# Patient Record
Sex: MALE | Race: WHITE | NOT HISPANIC OR LATINO | URBAN - METROPOLITAN AREA
[De-identification: names, ages, dates, MRNs, and addresses within clinical notes are randomized per-mention and may not be internally consistent; named-entity substitution may affect disease eponyms.]

---

## 2017-04-27 ENCOUNTER — COMMUNICATION - HEALTHEAST (OUTPATIENT)
Dept: FAMILY MEDICINE | Facility: CLINIC | Age: 57
End: 2017-04-27

## 2017-05-29 ENCOUNTER — COMMUNICATION - HEALTHEAST (OUTPATIENT)
Dept: FAMILY MEDICINE | Facility: CLINIC | Age: 57
End: 2017-05-29

## 2017-05-29 DIAGNOSIS — I10 HTN (HYPERTENSION): ICD-10-CM

## 2017-06-26 ENCOUNTER — COMMUNICATION - HEALTHEAST (OUTPATIENT)
Dept: FAMILY MEDICINE | Facility: CLINIC | Age: 57
End: 2017-06-26

## 2017-06-26 DIAGNOSIS — I10 HTN (HYPERTENSION): ICD-10-CM

## 2017-07-04 ENCOUNTER — COMMUNICATION - HEALTHEAST (OUTPATIENT)
Dept: FAMILY MEDICINE | Facility: CLINIC | Age: 57
End: 2017-07-04

## 2017-07-04 DIAGNOSIS — I10 HTN (HYPERTENSION): ICD-10-CM

## 2017-07-04 DIAGNOSIS — M10.9 GOUT: ICD-10-CM

## 2017-07-10 ENCOUNTER — COMMUNICATION - HEALTHEAST (OUTPATIENT)
Dept: FAMILY MEDICINE | Facility: CLINIC | Age: 57
End: 2017-07-10

## 2017-07-10 DIAGNOSIS — I10 HTN (HYPERTENSION): ICD-10-CM

## 2017-07-10 DIAGNOSIS — I10 UNSPECIFIED ESSENTIAL HYPERTENSION: ICD-10-CM

## 2017-07-11 ENCOUNTER — AMBULATORY - HEALTHEAST (OUTPATIENT)
Dept: FAMILY MEDICINE | Facility: CLINIC | Age: 57
End: 2017-07-11

## 2017-07-11 ENCOUNTER — COMMUNICATION - HEALTHEAST (OUTPATIENT)
Dept: FAMILY MEDICINE | Facility: CLINIC | Age: 57
End: 2017-07-11

## 2017-07-20 ENCOUNTER — OFFICE VISIT - HEALTHEAST (OUTPATIENT)
Dept: FAMILY MEDICINE | Facility: CLINIC | Age: 57
End: 2017-07-20

## 2017-07-20 ENCOUNTER — AMBULATORY - HEALTHEAST (OUTPATIENT)
Dept: FAMILY MEDICINE | Facility: CLINIC | Age: 57
End: 2017-07-20

## 2017-07-20 DIAGNOSIS — R89.7 ABNORMAL PROSTATE BIOPSY: ICD-10-CM

## 2017-07-20 DIAGNOSIS — M10.9 GOUT: ICD-10-CM

## 2017-07-20 DIAGNOSIS — E11.9 CONTROLLED TYPE 2 DIABETES MELLITUS WITHOUT COMPLICATION (H): ICD-10-CM

## 2017-07-20 DIAGNOSIS — E66.9 OBESITY, UNSPECIFIED: ICD-10-CM

## 2017-07-20 DIAGNOSIS — E78.49 OTHER HYPERLIPIDEMIA: ICD-10-CM

## 2017-07-20 DIAGNOSIS — Z00.00 WELL ADULT EXAM: ICD-10-CM

## 2017-07-20 DIAGNOSIS — I10 UNSPECIFIED ESSENTIAL HYPERTENSION: ICD-10-CM

## 2017-07-20 DIAGNOSIS — E03.9 HYPOTHYROIDISM: ICD-10-CM

## 2017-07-20 DIAGNOSIS — G47.33 OSA (OBSTRUCTIVE SLEEP APNEA): ICD-10-CM

## 2017-07-20 LAB
CHOLEST SERPL-MCNC: 153 MG/DL
FASTING STATUS PATIENT QL REPORTED: NORMAL
HDLC SERPL-MCNC: 42 MG/DL
LDLC SERPL CALC-MCNC: 89 MG/DL
PSA SERPL-MCNC: 4.8 NG/ML (ref 0–3.5)
TRIGL SERPL-MCNC: 110 MG/DL

## 2017-07-20 ASSESSMENT — MIFFLIN-ST. JEOR: SCORE: 2157.47

## 2017-07-21 ENCOUNTER — COMMUNICATION - HEALTHEAST (OUTPATIENT)
Dept: FAMILY MEDICINE | Facility: CLINIC | Age: 57
End: 2017-07-21

## 2017-07-21 LAB — HBA1C MFR BLD: 9.7 % (ref 4.2–6.1)

## 2017-07-24 ENCOUNTER — AMBULATORY - HEALTHEAST (OUTPATIENT)
Dept: FAMILY MEDICINE | Facility: CLINIC | Age: 57
End: 2017-07-24

## 2017-07-25 ENCOUNTER — AMBULATORY - HEALTHEAST (OUTPATIENT)
Dept: FAMILY MEDICINE | Facility: CLINIC | Age: 57
End: 2017-07-25

## 2017-07-25 DIAGNOSIS — E11.9 TYPE 2 DIABETES MELLITUS (H): ICD-10-CM

## 2017-08-09 ENCOUNTER — OFFICE VISIT - HEALTHEAST (OUTPATIENT)
Dept: EDUCATION SERVICES | Facility: CLINIC | Age: 57
End: 2017-08-09

## 2017-08-09 DIAGNOSIS — E11.9 CONTROLLED TYPE 2 DIABETES MELLITUS WITHOUT COMPLICATION, WITHOUT LONG-TERM CURRENT USE OF INSULIN (H): ICD-10-CM

## 2017-10-16 ENCOUNTER — COMMUNICATION - HEALTHEAST (OUTPATIENT)
Dept: EDUCATION SERVICES | Facility: CLINIC | Age: 57
End: 2017-10-16

## 2017-10-16 DIAGNOSIS — E11.9 CONTROLLED TYPE 2 DIABETES MELLITUS WITHOUT COMPLICATION, WITHOUT LONG-TERM CURRENT USE OF INSULIN (H): ICD-10-CM

## 2018-01-08 ENCOUNTER — COMMUNICATION - HEALTHEAST (OUTPATIENT)
Dept: EDUCATION SERVICES | Facility: CLINIC | Age: 58
End: 2018-01-08

## 2018-01-08 DIAGNOSIS — E11.9 CONTROLLED TYPE 2 DIABETES MELLITUS WITHOUT COMPLICATION, WITHOUT LONG-TERM CURRENT USE OF INSULIN (H): ICD-10-CM

## 2018-04-16 ENCOUNTER — COMMUNICATION - HEALTHEAST (OUTPATIENT)
Dept: EDUCATION SERVICES | Facility: CLINIC | Age: 58
End: 2018-04-16

## 2018-04-16 DIAGNOSIS — E11.9 CONTROLLED TYPE 2 DIABETES MELLITUS WITHOUT COMPLICATION, WITHOUT LONG-TERM CURRENT USE OF INSULIN (H): ICD-10-CM

## 2018-07-09 ENCOUNTER — COMMUNICATION - HEALTHEAST (OUTPATIENT)
Dept: FAMILY MEDICINE | Facility: CLINIC | Age: 58
End: 2018-07-09

## 2018-07-09 DIAGNOSIS — E03.9 HYPOTHYROIDISM: ICD-10-CM

## 2018-07-30 ENCOUNTER — COMMUNICATION - HEALTHEAST (OUTPATIENT)
Dept: FAMILY MEDICINE | Facility: CLINIC | Age: 58
End: 2018-07-30

## 2018-08-07 ENCOUNTER — OFFICE VISIT - HEALTHEAST (OUTPATIENT)
Dept: FAMILY MEDICINE | Facility: CLINIC | Age: 58
End: 2018-08-07

## 2018-08-07 DIAGNOSIS — E66.9 OBESITY: ICD-10-CM

## 2018-08-07 DIAGNOSIS — E78.49 OTHER HYPERLIPIDEMIA: ICD-10-CM

## 2018-08-07 DIAGNOSIS — M10.9 GOUT: ICD-10-CM

## 2018-08-07 DIAGNOSIS — R97.20 ELEVATED PSA: ICD-10-CM

## 2018-08-07 DIAGNOSIS — E11.9 CONTROLLED TYPE 2 DIABETES MELLITUS WITHOUT COMPLICATION, WITHOUT LONG-TERM CURRENT USE OF INSULIN (H): ICD-10-CM

## 2018-08-07 DIAGNOSIS — R89.7 ABNORMAL PROSTATE BIOPSY: ICD-10-CM

## 2018-08-07 DIAGNOSIS — I10 ESSENTIAL HYPERTENSION: ICD-10-CM

## 2018-08-07 DIAGNOSIS — G47.33 OSA (OBSTRUCTIVE SLEEP APNEA): ICD-10-CM

## 2018-08-07 DIAGNOSIS — E03.9 HYPOTHYROIDISM: ICD-10-CM

## 2018-08-07 LAB
ALBUMIN SERPL-MCNC: 4.3 G/DL (ref 3.5–5)
ALP SERPL-CCNC: 46 U/L (ref 45–120)
ALT SERPL W P-5'-P-CCNC: 42 U/L (ref 0–45)
ANION GAP SERPL CALCULATED.3IONS-SCNC: 12 MMOL/L (ref 5–18)
AST SERPL W P-5'-P-CCNC: 24 U/L (ref 0–40)
BILIRUB SERPL-MCNC: 0.8 MG/DL (ref 0–1)
BUN SERPL-MCNC: 16 MG/DL (ref 8–22)
CALCIUM SERPL-MCNC: 9.5 MG/DL (ref 8.5–10.5)
CHLORIDE BLD-SCNC: 102 MMOL/L (ref 98–107)
CHOLEST SERPL-MCNC: 147 MG/DL
CO2 SERPL-SCNC: 26 MMOL/L (ref 22–31)
CREAT SERPL-MCNC: 0.93 MG/DL (ref 0.7–1.3)
CREAT UR-MCNC: 101.7 MG/DL
FASTING STATUS PATIENT QL REPORTED: YES
GFR SERPL CREATININE-BSD FRML MDRD: >60 ML/MIN/1.73M2
GLUCOSE BLD-MCNC: 175 MG/DL (ref 70–125)
HBA1C MFR BLD: 7.1 % (ref 3.5–6)
HDLC SERPL-MCNC: 43 MG/DL
LDLC SERPL CALC-MCNC: 80 MG/DL
MICROALBUMIN UR-MCNC: 0.65 MG/DL (ref 0–1.99)
MICROALBUMIN/CREAT UR: 6.4 MG/G
POTASSIUM BLD-SCNC: 4.3 MMOL/L (ref 3.5–5)
PROT SERPL-MCNC: 7 G/DL (ref 6–8)
PSA SERPL-MCNC: 5.9 NG/ML (ref 0–3.5)
SODIUM SERPL-SCNC: 140 MMOL/L (ref 136–145)
T4 FREE SERPL-MCNC: 0.9 NG/DL (ref 0.7–1.8)
TRIGL SERPL-MCNC: 121 MG/DL
TSH SERPL DL<=0.005 MIU/L-ACNC: 14.23 UIU/ML (ref 0.3–5)
URATE SERPL-MCNC: 7 MG/DL (ref 3–8)

## 2018-08-07 ASSESSMENT — MIFFLIN-ST. JEOR: SCORE: 2175.62

## 2018-08-09 ENCOUNTER — AMBULATORY - HEALTHEAST (OUTPATIENT)
Dept: FAMILY MEDICINE | Facility: CLINIC | Age: 58
End: 2018-08-09

## 2018-08-09 DIAGNOSIS — R97.20 ELEVATED PROSTATE SPECIFIC ANTIGEN (PSA): ICD-10-CM

## 2018-08-27 ENCOUNTER — COMMUNICATION - HEALTHEAST (OUTPATIENT)
Dept: FAMILY MEDICINE | Facility: CLINIC | Age: 58
End: 2018-08-27

## 2018-09-07 ENCOUNTER — COMMUNICATION - HEALTHEAST (OUTPATIENT)
Dept: FAMILY MEDICINE | Facility: CLINIC | Age: 58
End: 2018-09-07

## 2018-09-18 ENCOUNTER — OFFICE VISIT - HEALTHEAST (OUTPATIENT)
Dept: FAMILY MEDICINE | Facility: CLINIC | Age: 58
End: 2018-09-18

## 2018-09-18 DIAGNOSIS — E11.9 TYPE 2 DIABETES MELLITUS (H): ICD-10-CM

## 2018-09-18 DIAGNOSIS — R06.2 WHEEZING: ICD-10-CM

## 2018-09-18 DIAGNOSIS — J02.9 SORE THROAT: ICD-10-CM

## 2018-09-18 DIAGNOSIS — J20.9 ACUTE BRONCHITIS: ICD-10-CM

## 2018-09-18 LAB — DEPRECATED S PYO AG THROAT QL EIA: NORMAL

## 2018-09-18 ASSESSMENT — MIFFLIN-ST. JEOR: SCORE: 2179.87

## 2018-09-19 LAB — GROUP A STREP BY PCR: NORMAL

## 2018-10-01 ENCOUNTER — RECORDS - HEALTHEAST (OUTPATIENT)
Dept: ADMINISTRATIVE | Facility: OTHER | Age: 58
End: 2018-10-01

## 2018-10-02 ENCOUNTER — COMMUNICATION - HEALTHEAST (OUTPATIENT)
Dept: FAMILY MEDICINE | Facility: CLINIC | Age: 58
End: 2018-10-02

## 2018-10-04 ENCOUNTER — COMMUNICATION - HEALTHEAST (OUTPATIENT)
Dept: FAMILY MEDICINE | Facility: CLINIC | Age: 58
End: 2018-10-04

## 2018-10-08 ENCOUNTER — COMMUNICATION - HEALTHEAST (OUTPATIENT)
Dept: FAMILY MEDICINE | Facility: CLINIC | Age: 58
End: 2018-10-08

## 2018-10-08 DIAGNOSIS — I10 ESSENTIAL HYPERTENSION: ICD-10-CM

## 2018-11-13 ENCOUNTER — AMBULATORY - HEALTHEAST (OUTPATIENT)
Dept: FAMILY MEDICINE | Facility: CLINIC | Age: 58
End: 2018-11-13

## 2018-11-13 ENCOUNTER — AMBULATORY - HEALTHEAST (OUTPATIENT)
Dept: LAB | Facility: CLINIC | Age: 58
End: 2018-11-13

## 2018-11-13 DIAGNOSIS — R97.20 ELEVATED PROSTATE SPECIFIC ANTIGEN (PSA): ICD-10-CM

## 2018-11-13 LAB
PSA SERPL-MCNC: 6.6 NG/ML (ref 0–3.5)
T4 FREE SERPL-MCNC: 1 NG/DL (ref 0.7–1.8)
TSH SERPL DL<=0.005 MIU/L-ACNC: 6.66 UIU/ML (ref 0.3–5)

## 2018-11-14 ENCOUNTER — COMMUNICATION - HEALTHEAST (OUTPATIENT)
Dept: FAMILY MEDICINE | Facility: CLINIC | Age: 58
End: 2018-11-14

## 2018-11-27 ENCOUNTER — COMMUNICATION - HEALTHEAST (OUTPATIENT)
Dept: FAMILY MEDICINE | Facility: CLINIC | Age: 58
End: 2018-11-27

## 2018-11-27 DIAGNOSIS — M10.9 GOUT: ICD-10-CM

## 2018-12-10 ENCOUNTER — RECORDS - HEALTHEAST (OUTPATIENT)
Dept: ADMINISTRATIVE | Facility: OTHER | Age: 58
End: 2018-12-10

## 2019-01-25 ENCOUNTER — RECORDS - HEALTHEAST (OUTPATIENT)
Dept: ADMINISTRATIVE | Facility: OTHER | Age: 59
End: 2019-01-25

## 2019-02-06 ENCOUNTER — RECORDS - HEALTHEAST (OUTPATIENT)
Dept: ADMINISTRATIVE | Facility: OTHER | Age: 59
End: 2019-02-06

## 2019-02-12 ENCOUNTER — OFFICE VISIT - HEALTHEAST (OUTPATIENT)
Dept: FAMILY MEDICINE | Facility: CLINIC | Age: 59
End: 2019-02-12

## 2019-02-12 DIAGNOSIS — E78.49 OTHER HYPERLIPIDEMIA: ICD-10-CM

## 2019-02-12 DIAGNOSIS — I10 ESSENTIAL HYPERTENSION: ICD-10-CM

## 2019-02-12 DIAGNOSIS — C61 PROSTATE CANCER (H): ICD-10-CM

## 2019-02-12 DIAGNOSIS — E11.9 CONTROLLED TYPE 2 DIABETES MELLITUS WITHOUT COMPLICATION, WITHOUT LONG-TERM CURRENT USE OF INSULIN (H): ICD-10-CM

## 2019-02-12 DIAGNOSIS — E03.9 HYPOTHYROIDISM, UNSPECIFIED TYPE: ICD-10-CM

## 2019-02-12 DIAGNOSIS — G47.33 OSA (OBSTRUCTIVE SLEEP APNEA): ICD-10-CM

## 2019-02-12 DIAGNOSIS — Z01.818 PREOP GENERAL PHYSICAL EXAM: ICD-10-CM

## 2019-02-12 LAB
ALBUMIN SERPL-MCNC: 4.1 G/DL (ref 3.5–5)
ALP SERPL-CCNC: 60 U/L (ref 45–120)
ALT SERPL W P-5'-P-CCNC: 40 U/L (ref 0–45)
ANION GAP SERPL CALCULATED.3IONS-SCNC: 9 MMOL/L (ref 5–18)
AST SERPL W P-5'-P-CCNC: 23 U/L (ref 0–40)
ATRIAL RATE - MUSE: 56 BPM
BILIRUB SERPL-MCNC: 0.9 MG/DL (ref 0–1)
BUN SERPL-MCNC: 16 MG/DL (ref 8–22)
CALCIUM SERPL-MCNC: 9.8 MG/DL (ref 8.5–10.5)
CHLORIDE BLD-SCNC: 105 MMOL/L (ref 98–107)
CO2 SERPL-SCNC: 24 MMOL/L (ref 22–31)
CREAT SERPL-MCNC: 0.92 MG/DL (ref 0.7–1.3)
DIASTOLIC BLOOD PRESSURE - MUSE: NORMAL MMHG
ERYTHROCYTE [DISTWIDTH] IN BLOOD BY AUTOMATED COUNT: 11.7 % (ref 11–14.5)
GFR SERPL CREATININE-BSD FRML MDRD: >60 ML/MIN/1.73M2
GLUCOSE BLD-MCNC: 134 MG/DL (ref 70–125)
HBA1C MFR BLD: 7 % (ref 3.5–6)
HCT VFR BLD AUTO: 48.7 % (ref 40–54)
HGB BLD-MCNC: 15.9 G/DL (ref 14–18)
INTERPRETATION ECG - MUSE: NORMAL
MCH RBC QN AUTO: 31 PG (ref 27–34)
MCHC RBC AUTO-ENTMCNC: 32.7 G/DL (ref 32–36)
MCV RBC AUTO: 95 FL (ref 80–100)
P AXIS - MUSE: 31 DEGREES
PLATELET # BLD AUTO: 255 THOU/UL (ref 140–440)
PMV BLD AUTO: 8.5 FL (ref 7–10)
POTASSIUM BLD-SCNC: 4.2 MMOL/L (ref 3.5–5)
PR INTERVAL - MUSE: 196 MS
PROT SERPL-MCNC: 6.9 G/DL (ref 6–8)
QRS DURATION - MUSE: 98 MS
QT - MUSE: 432 MS
QTC - MUSE: 416 MS
R AXIS - MUSE: 12 DEGREES
RBC # BLD AUTO: 5.14 MILL/UL (ref 4.4–6.2)
SODIUM SERPL-SCNC: 138 MMOL/L (ref 136–145)
SYSTOLIC BLOOD PRESSURE - MUSE: NORMAL MMHG
T AXIS - MUSE: 15 DEGREES
TSH SERPL DL<=0.005 MIU/L-ACNC: 2.88 UIU/ML (ref 0.3–5)
VENTRICULAR RATE- MUSE: 56 BPM
WBC: 8.4 THOU/UL (ref 4–11)

## 2019-02-12 ASSESSMENT — MIFFLIN-ST. JEOR: SCORE: 2035.29

## 2019-02-25 ENCOUNTER — RECORDS - HEALTHEAST (OUTPATIENT)
Dept: ADMINISTRATIVE | Facility: OTHER | Age: 59
End: 2019-02-25

## 2019-02-26 ENCOUNTER — COMMUNICATION - HEALTHEAST (OUTPATIENT)
Dept: FAMILY MEDICINE | Facility: CLINIC | Age: 59
End: 2019-02-26

## 2019-02-28 ENCOUNTER — RECORDS - HEALTHEAST (OUTPATIENT)
Dept: ADMINISTRATIVE | Facility: OTHER | Age: 59
End: 2019-02-28

## 2019-03-18 ENCOUNTER — OFFICE VISIT - HEALTHEAST (OUTPATIENT)
Dept: FAMILY MEDICINE | Facility: CLINIC | Age: 59
End: 2019-03-18

## 2019-03-18 DIAGNOSIS — Z01.818 PREOP GENERAL PHYSICAL EXAM: ICD-10-CM

## 2019-03-18 DIAGNOSIS — E66.01 MORBID OBESITY (H): ICD-10-CM

## 2019-03-18 DIAGNOSIS — C61 PROSTATE CANCER (H): ICD-10-CM

## 2019-03-18 DIAGNOSIS — E03.9 HYPOTHYROIDISM, UNSPECIFIED TYPE: ICD-10-CM

## 2019-03-18 DIAGNOSIS — E78.49 OTHER HYPERLIPIDEMIA: ICD-10-CM

## 2019-03-18 DIAGNOSIS — D49.4 BLADDER TUMOR: ICD-10-CM

## 2019-03-18 DIAGNOSIS — G47.33 OSA (OBSTRUCTIVE SLEEP APNEA): ICD-10-CM

## 2019-03-18 DIAGNOSIS — I10 ESSENTIAL HYPERTENSION: ICD-10-CM

## 2019-03-18 DIAGNOSIS — E11.9 CONTROLLED TYPE 2 DIABETES MELLITUS WITHOUT COMPLICATION, WITHOUT LONG-TERM CURRENT USE OF INSULIN (H): ICD-10-CM

## 2019-03-18 ASSESSMENT — MIFFLIN-ST. JEOR: SCORE: 2180.72

## 2019-03-19 ENCOUNTER — RECORDS - HEALTHEAST (OUTPATIENT)
Dept: ADMINISTRATIVE | Facility: OTHER | Age: 59
End: 2019-03-19

## 2019-03-21 ENCOUNTER — RECORDS - HEALTHEAST (OUTPATIENT)
Dept: ADMINISTRATIVE | Facility: OTHER | Age: 59
End: 2019-03-21

## 2019-04-15 ENCOUNTER — COMMUNICATION - HEALTHEAST (OUTPATIENT)
Dept: FAMILY MEDICINE | Facility: CLINIC | Age: 59
End: 2019-04-15

## 2019-04-15 DIAGNOSIS — I10 ESSENTIAL HYPERTENSION: ICD-10-CM

## 2019-04-15 DIAGNOSIS — E11.9 CONTROLLED TYPE 2 DIABETES MELLITUS WITHOUT COMPLICATION, WITHOUT LONG-TERM CURRENT USE OF INSULIN (H): ICD-10-CM

## 2019-06-17 ENCOUNTER — COMMUNICATION - HEALTHEAST (OUTPATIENT)
Dept: FAMILY MEDICINE | Facility: CLINIC | Age: 59
End: 2019-06-17

## 2019-06-17 DIAGNOSIS — I10 ESSENTIAL HYPERTENSION: ICD-10-CM

## 2019-06-27 ENCOUNTER — RECORDS - HEALTHEAST (OUTPATIENT)
Dept: ADMINISTRATIVE | Facility: OTHER | Age: 59
End: 2019-06-27

## 2019-07-05 ENCOUNTER — RECORDS - HEALTHEAST (OUTPATIENT)
Dept: ADMINISTRATIVE | Facility: OTHER | Age: 59
End: 2019-07-05

## 2019-07-08 ENCOUNTER — COMMUNICATION - HEALTHEAST (OUTPATIENT)
Dept: FAMILY MEDICINE | Facility: CLINIC | Age: 59
End: 2019-07-08

## 2019-07-08 DIAGNOSIS — E11.9 CONTROLLED TYPE 2 DIABETES MELLITUS WITHOUT COMPLICATION, WITHOUT LONG-TERM CURRENT USE OF INSULIN (H): ICD-10-CM

## 2019-07-29 ENCOUNTER — COMMUNICATION - HEALTHEAST (OUTPATIENT)
Dept: FAMILY MEDICINE | Facility: CLINIC | Age: 59
End: 2019-07-29

## 2019-07-29 DIAGNOSIS — I10 ESSENTIAL HYPERTENSION: ICD-10-CM

## 2019-07-29 DIAGNOSIS — E03.9 HYPOTHYROIDISM, UNSPECIFIED TYPE: ICD-10-CM

## 2019-08-20 ENCOUNTER — RECORDS - HEALTHEAST (OUTPATIENT)
Dept: ADMINISTRATIVE | Facility: OTHER | Age: 59
End: 2019-08-20

## 2019-08-26 ENCOUNTER — COMMUNICATION - HEALTHEAST (OUTPATIENT)
Dept: FAMILY MEDICINE | Facility: CLINIC | Age: 59
End: 2019-08-26

## 2019-08-26 ENCOUNTER — OFFICE VISIT - HEALTHEAST (OUTPATIENT)
Dept: FAMILY MEDICINE | Facility: CLINIC | Age: 59
End: 2019-08-26

## 2019-08-26 DIAGNOSIS — I10 ESSENTIAL HYPERTENSION: ICD-10-CM

## 2019-08-26 DIAGNOSIS — R33.9 URINARY RETENTION: ICD-10-CM

## 2019-08-26 DIAGNOSIS — E66.812 CLASS 2 SEVERE OBESITY DUE TO EXCESS CALORIES WITH SERIOUS COMORBIDITY AND BODY MASS INDEX (BMI) OF 35.0 TO 35.9 IN ADULT (H): ICD-10-CM

## 2019-08-26 DIAGNOSIS — C61 PROSTATE CANCER (H): ICD-10-CM

## 2019-08-26 DIAGNOSIS — E78.49 OTHER HYPERLIPIDEMIA: ICD-10-CM

## 2019-08-26 DIAGNOSIS — E03.9 HYPOTHYROIDISM, UNSPECIFIED TYPE: ICD-10-CM

## 2019-08-26 DIAGNOSIS — Z01.818 ENCOUNTER FOR PREOPERATIVE EXAMINATION FOR GENERAL SURGICAL PROCEDURE: ICD-10-CM

## 2019-08-26 DIAGNOSIS — E11.9 CONTROLLED TYPE 2 DIABETES MELLITUS WITHOUT COMPLICATION, WITHOUT LONG-TERM CURRENT USE OF INSULIN (H): ICD-10-CM

## 2019-08-26 DIAGNOSIS — E66.01 CLASS 2 SEVERE OBESITY DUE TO EXCESS CALORIES WITH SERIOUS COMORBIDITY AND BODY MASS INDEX (BMI) OF 35.0 TO 35.9 IN ADULT (H): ICD-10-CM

## 2019-08-26 DIAGNOSIS — E66.01 MORBID OBESITY (H): ICD-10-CM

## 2019-08-26 DIAGNOSIS — I89.8 LYMPHOCELE: ICD-10-CM

## 2019-08-26 DIAGNOSIS — G47.33 OSA (OBSTRUCTIVE SLEEP APNEA): ICD-10-CM

## 2019-08-26 DIAGNOSIS — D49.4 BLADDER TUMOR: ICD-10-CM

## 2019-08-26 LAB
CREAT UR-MCNC: 70.8 MG/DL
HBA1C MFR BLD: 7.5 % (ref 3.5–6)
MICROALBUMIN UR-MCNC: 16.92 MG/DL (ref 0–1.99)
MICROALBUMIN/CREAT UR: 239 MG/G

## 2019-08-26 ASSESSMENT — MIFFLIN-ST. JEOR: SCORE: 2121.75

## 2019-10-23 ENCOUNTER — RECORDS - HEALTHEAST (OUTPATIENT)
Dept: ADMINISTRATIVE | Facility: OTHER | Age: 59
End: 2019-10-23

## 2019-11-25 ENCOUNTER — COMMUNICATION - HEALTHEAST (OUTPATIENT)
Dept: FAMILY MEDICINE | Facility: CLINIC | Age: 59
End: 2019-11-25

## 2019-11-25 DIAGNOSIS — M10.9 GOUT: ICD-10-CM

## 2019-12-09 ENCOUNTER — RECORDS - HEALTHEAST (OUTPATIENT)
Dept: ADMINISTRATIVE | Facility: OTHER | Age: 59
End: 2019-12-09

## 2020-02-24 ENCOUNTER — COMMUNICATION - HEALTHEAST (OUTPATIENT)
Dept: FAMILY MEDICINE | Facility: CLINIC | Age: 60
End: 2020-02-24

## 2020-02-24 DIAGNOSIS — M10.9 GOUT: ICD-10-CM

## 2020-03-16 ENCOUNTER — COMMUNICATION - HEALTHEAST (OUTPATIENT)
Dept: FAMILY MEDICINE | Facility: CLINIC | Age: 60
End: 2020-03-16

## 2020-03-16 DIAGNOSIS — I10 ESSENTIAL HYPERTENSION: ICD-10-CM

## 2020-03-17 RX ORDER — LOSARTAN POTASSIUM 100 MG/1
TABLET ORAL
Qty: 90 TABLET | Refills: 3 | Status: SHIPPED | OUTPATIENT
Start: 2020-03-17

## 2020-04-06 ENCOUNTER — COMMUNICATION - HEALTHEAST (OUTPATIENT)
Dept: FAMILY MEDICINE | Facility: CLINIC | Age: 60
End: 2020-04-06

## 2020-04-06 DIAGNOSIS — I10 ESSENTIAL HYPERTENSION: ICD-10-CM

## 2020-04-27 ENCOUNTER — COMMUNICATION - HEALTHEAST (OUTPATIENT)
Dept: FAMILY MEDICINE | Facility: CLINIC | Age: 60
End: 2020-04-27

## 2020-04-27 DIAGNOSIS — I10 ESSENTIAL HYPERTENSION: ICD-10-CM

## 2020-05-05 ENCOUNTER — COMMUNICATION - HEALTHEAST (OUTPATIENT)
Dept: FAMILY MEDICINE | Facility: CLINIC | Age: 60
End: 2020-05-05

## 2020-05-05 DIAGNOSIS — E03.9 HYPOTHYROIDISM, UNSPECIFIED TYPE: ICD-10-CM

## 2020-05-07 RX ORDER — LEVOTHYROXINE SODIUM 150 UG/1
TABLET ORAL
Qty: 90 TABLET | Refills: 3 | Status: SHIPPED | OUTPATIENT
Start: 2020-05-07

## 2020-05-25 ENCOUNTER — COMMUNICATION - HEALTHEAST (OUTPATIENT)
Dept: FAMILY MEDICINE | Facility: CLINIC | Age: 60
End: 2020-05-25

## 2020-05-25 DIAGNOSIS — M10.9 GOUT: ICD-10-CM

## 2020-05-27 RX ORDER — ALLOPURINOL 100 MG/1
TABLET ORAL
Qty: 90 TABLET | Refills: 3 | Status: SHIPPED | OUTPATIENT
Start: 2020-05-27

## 2020-07-12 ENCOUNTER — COMMUNICATION - HEALTHEAST (OUTPATIENT)
Dept: FAMILY MEDICINE | Facility: CLINIC | Age: 60
End: 2020-07-12

## 2020-07-12 DIAGNOSIS — I10 ESSENTIAL HYPERTENSION: ICD-10-CM

## 2020-07-12 DIAGNOSIS — E11.9 CONTROLLED TYPE 2 DIABETES MELLITUS WITHOUT COMPLICATION, WITHOUT LONG-TERM CURRENT USE OF INSULIN (H): ICD-10-CM

## 2020-07-26 ENCOUNTER — COMMUNICATION - HEALTHEAST (OUTPATIENT)
Dept: FAMILY MEDICINE | Facility: CLINIC | Age: 60
End: 2020-07-26

## 2020-07-26 DIAGNOSIS — I10 ESSENTIAL HYPERTENSION: ICD-10-CM

## 2020-08-23 ENCOUNTER — COMMUNICATION - HEALTHEAST (OUTPATIENT)
Dept: FAMILY MEDICINE | Facility: CLINIC | Age: 60
End: 2020-08-23

## 2020-08-23 DIAGNOSIS — E78.49 OTHER HYPERLIPIDEMIA: ICD-10-CM

## 2020-09-02 ENCOUNTER — RECORDS - HEALTHEAST (OUTPATIENT)
Dept: ADMINISTRATIVE | Facility: OTHER | Age: 60
End: 2020-09-02

## 2020-09-10 ENCOUNTER — OFFICE VISIT - HEALTHEAST (OUTPATIENT)
Dept: FAMILY MEDICINE | Facility: CLINIC | Age: 60
End: 2020-09-10

## 2020-09-10 DIAGNOSIS — E03.9 HYPOTHYROIDISM, UNSPECIFIED TYPE: ICD-10-CM

## 2020-09-10 DIAGNOSIS — M10.9 GOUT, UNSPECIFIED CAUSE, UNSPECIFIED CHRONICITY, UNSPECIFIED SITE: ICD-10-CM

## 2020-09-10 DIAGNOSIS — E11.29 CONTROLLED TYPE 2 DIABETES MELLITUS WITH MICROALBUMINURIA, WITHOUT LONG-TERM CURRENT USE OF INSULIN (H): ICD-10-CM

## 2020-09-10 DIAGNOSIS — C61 PROSTATE CANCER (H): ICD-10-CM

## 2020-09-10 DIAGNOSIS — G47.33 OSA (OBSTRUCTIVE SLEEP APNEA): ICD-10-CM

## 2020-09-10 DIAGNOSIS — I10 ESSENTIAL HYPERTENSION: ICD-10-CM

## 2020-09-10 DIAGNOSIS — G62.9 PERIPHERAL POLYNEUROPATHY: ICD-10-CM

## 2020-09-10 DIAGNOSIS — E66.01 MORBID OBESITY (H): ICD-10-CM

## 2020-09-10 DIAGNOSIS — Z00.00 ENCOUNTER FOR GENERAL ADULT MEDICAL EXAMINATION WITHOUT ABNORMAL FINDINGS: ICD-10-CM

## 2020-09-10 DIAGNOSIS — E78.5 HYPERLIPIDEMIA LDL GOAL <70: ICD-10-CM

## 2020-09-10 DIAGNOSIS — R80.9 CONTROLLED TYPE 2 DIABETES MELLITUS WITH MICROALBUMINURIA, WITHOUT LONG-TERM CURRENT USE OF INSULIN (H): ICD-10-CM

## 2020-09-10 LAB
ALBUMIN SERPL-MCNC: 4.1 G/DL (ref 3.5–5)
ALP SERPL-CCNC: 51 U/L (ref 45–120)
ALT SERPL W P-5'-P-CCNC: 40 U/L (ref 0–45)
ANION GAP SERPL CALCULATED.3IONS-SCNC: 11 MMOL/L (ref 5–18)
AST SERPL W P-5'-P-CCNC: 21 U/L (ref 0–40)
BILIRUB SERPL-MCNC: 0.5 MG/DL (ref 0–1)
BUN SERPL-MCNC: 16 MG/DL (ref 8–22)
CALCIUM SERPL-MCNC: 9.5 MG/DL (ref 8.5–10.5)
CHLORIDE BLD-SCNC: 103 MMOL/L (ref 98–107)
CHOLEST SERPL-MCNC: 154 MG/DL
CO2 SERPL-SCNC: 24 MMOL/L (ref 22–31)
CREAT SERPL-MCNC: 0.92 MG/DL (ref 0.7–1.3)
CREAT UR-MCNC: 169.4 MG/DL
FASTING STATUS PATIENT QL REPORTED: NO
GFR SERPL CREATININE-BSD FRML MDRD: >60 ML/MIN/1.73M2
GLUCOSE BLD-MCNC: 158 MG/DL (ref 70–125)
HBA1C MFR BLD: 6.8 %
HDLC SERPL-MCNC: 48 MG/DL
LDLC SERPL CALC-MCNC: 66 MG/DL
MICROALBUMIN UR-MCNC: 2.14 MG/DL (ref 0–1.99)
MICROALBUMIN/CREAT UR: 12.6 MG/G
POTASSIUM BLD-SCNC: 4.2 MMOL/L (ref 3.5–5)
PROT SERPL-MCNC: 6.8 G/DL (ref 6–8)
SODIUM SERPL-SCNC: 138 MMOL/L (ref 136–145)
TRIGL SERPL-MCNC: 198 MG/DL
TSH SERPL DL<=0.005 MIU/L-ACNC: 2.62 UIU/ML (ref 0.3–5)
URATE SERPL-MCNC: 6.2 MG/DL (ref 3–8)

## 2020-09-10 RX ORDER — SILDENAFIL CITRATE 20 MG/1
TABLET ORAL PRN
Status: SHIPPED | COMMUNITY
Start: 2020-06-24

## 2020-09-10 RX ORDER — AMLODIPINE BESYLATE 10 MG/1
10 TABLET ORAL DAILY
Qty: 90 TABLET | Refills: 3 | Status: SHIPPED | OUTPATIENT
Start: 2020-09-10

## 2020-09-10 RX ORDER — SIMVASTATIN 40 MG
40 TABLET ORAL AT BEDTIME
Qty: 90 TABLET | Refills: 3 | Status: SHIPPED | OUTPATIENT
Start: 2020-09-10

## 2020-09-10 RX ORDER — PHENOL 1.4 %
AEROSOL, SPRAY (ML) MUCOUS MEMBRANE AT BEDTIME
Status: SHIPPED | COMMUNITY
Start: 2020-09-10

## 2020-09-10 RX ORDER — ATENOLOL 100 MG/1
100 TABLET ORAL DAILY
Qty: 90 TABLET | Refills: 3 | Status: SHIPPED | OUTPATIENT
Start: 2020-09-10

## 2020-09-10 ASSESSMENT — MIFFLIN-ST. JEOR: SCORE: 2147.9

## 2020-09-14 ENCOUNTER — RECORDS - HEALTHEAST (OUTPATIENT)
Dept: ADMINISTRATIVE | Facility: OTHER | Age: 60
End: 2020-09-14

## 2020-10-05 ENCOUNTER — COMMUNICATION - HEALTHEAST (OUTPATIENT)
Dept: FAMILY MEDICINE | Facility: CLINIC | Age: 60
End: 2020-10-05

## 2020-10-07 ENCOUNTER — OFFICE VISIT - HEALTHEAST (OUTPATIENT)
Dept: FAMILY MEDICINE | Facility: CLINIC | Age: 60
End: 2020-10-07

## 2020-10-07 DIAGNOSIS — E66.01 MORBID OBESITY (H): ICD-10-CM

## 2020-10-07 DIAGNOSIS — E11.29 CONTROLLED TYPE 2 DIABETES MELLITUS WITH MICROALBUMINURIA, WITHOUT LONG-TERM CURRENT USE OF INSULIN (H): ICD-10-CM

## 2020-10-07 DIAGNOSIS — G47.33 OSA (OBSTRUCTIVE SLEEP APNEA): ICD-10-CM

## 2020-10-07 DIAGNOSIS — C61 PROSTATE CANCER (H): ICD-10-CM

## 2020-10-07 DIAGNOSIS — E78.5 HYPERLIPIDEMIA LDL GOAL <70: ICD-10-CM

## 2020-10-07 DIAGNOSIS — E03.9 HYPOTHYROIDISM, UNSPECIFIED TYPE: ICD-10-CM

## 2020-10-07 DIAGNOSIS — I10 ESSENTIAL HYPERTENSION: ICD-10-CM

## 2020-10-07 DIAGNOSIS — M10.9 GOUT, UNSPECIFIED CAUSE, UNSPECIFIED CHRONICITY, UNSPECIFIED SITE: ICD-10-CM

## 2020-10-07 DIAGNOSIS — Z01.818 PRE-OPERATIVE EXAMINATION: ICD-10-CM

## 2020-10-07 DIAGNOSIS — G62.9 PERIPHERAL POLYNEUROPATHY: ICD-10-CM

## 2020-10-07 DIAGNOSIS — N20.0 NEPHROLITHIASIS: ICD-10-CM

## 2020-10-07 DIAGNOSIS — R80.9 CONTROLLED TYPE 2 DIABETES MELLITUS WITH MICROALBUMINURIA, WITHOUT LONG-TERM CURRENT USE OF INSULIN (H): ICD-10-CM

## 2020-10-07 ASSESSMENT — MIFFLIN-ST. JEOR: SCORE: 2141.88

## 2020-10-26 ENCOUNTER — RECORDS - HEALTHEAST (OUTPATIENT)
Dept: ADMINISTRATIVE | Facility: OTHER | Age: 60
End: 2020-10-26

## 2021-03-15 ENCOUNTER — COMMUNICATION - HEALTHEAST (OUTPATIENT)
Dept: FAMILY MEDICINE | Facility: CLINIC | Age: 61
End: 2021-03-15

## 2021-05-25 ENCOUNTER — RECORDS - HEALTHEAST (OUTPATIENT)
Dept: ADMINISTRATIVE | Facility: CLINIC | Age: 61
End: 2021-05-25

## 2021-05-27 NOTE — TELEPHONE ENCOUNTER
FYI - Status Update  Who is Calling: Patient  Update: Patient states he is out of medication. Questioning if refills can be processed today.  Okay to leave a detailed message?:  No return call needed

## 2021-05-27 NOTE — TELEPHONE ENCOUNTER
Refill Approved    Rx renewed per Medication Renewal Policy. Medication was last renewed on 10/2/18.4/18/18    Radha Hill, [facility] Triage/Med Refill 4/16/2019     Requested Prescriptions   Pending Prescriptions Disp Refills     atenolol (TENORMIN) 100 MG tablet [Pharmacy Med Name: ATENOLOL 100MG      TAB] 90 tablet 1     Sig: TAKE 1 TABLET BY MOUTH ONCE DAILY       Beta-Blockers Refill Protocol Passed - 4/16/2019 10:01 AM        Passed - PCP or prescribing provider visit in past 12 months or next 3 months     Last office visit with prescriber/PCP: 9/18/2018 Elliott Sandra DO OR same dept: 9/18/2018 Elliott Sandra DO OR same specialty: 9/18/2018 Elliott Sandra DO  Last physical: 3/18/2019 Last MTM visit: Visit date not found   Next visit within 3 mo: Visit date not found  Next physical within 3 mo: Visit date not found  Prescriber OR PCP: Elliott Elam DO  Last diagnosis associated with med order: There are no diagnoses linked to this encounter.  If protocol passes may refill for 12 months if within 3 months of last provider visit (or a total of 15 months).             Passed - Blood pressure filed in past 12 months     BP Readings from Last 1 Encounters:   03/18/19 134/80             metFORMIN (GLUCOPHAGE) 1000 MG tablet [Pharmacy Med Name: METFORMIN 1000MG TAB] 180 tablet 3     Sig: TAKE 1 TABLET BY MOUTH TWICE DAILY WITH MEALS       Metformin Refill Protocol Passed - 4/16/2019 10:01 AM        Passed - Blood pressure in last 12 months     BP Readings from Last 1 Encounters:   03/18/19 134/80             Passed - LFT or AST or ALT in last 12 months     Albumin   Date Value Ref Range Status   02/12/2019 4.1 3.5 - 5.0 g/dL Final     Bilirubin, Total   Date Value Ref Range Status   02/12/2019 0.9 0.0 - 1.0 mg/dL Final     Alkaline Phosphatase   Date Value Ref Range Status   02/12/2019 60 45 - 120 U/L Final     AST   Date Value Ref Range Status    02/12/2019 23 0 - 40 U/L Final     ALT   Date Value Ref Range Status   02/12/2019 40 0 - 45 U/L Final     Protein, Total   Date Value Ref Range Status   02/12/2019 6.9 6.0 - 8.0 g/dL Final                Passed - GFR or Serum Creatinine in last 6 months     GFR MDRD Non Af Amer   Date Value Ref Range Status   02/12/2019 >60 >60 mL/min/1.73m2 Final     GFR MDRD Af Amer   Date Value Ref Range Status   02/12/2019 >60 >60 mL/min/1.73m2 Final             Passed - Visit with PCP or prescribing provider visit in last 6 months or next 3 months     Last office visit with prescriber/PCP: Visit date not found OR same dept: 9/18/2018 Elliott Sandra DO OR same specialty: 9/18/2018 Elliott Sandra DO Last physical: 3/18/2019 Last MTM visit: Visit date not found         Next appt within 3 mo: Visit date not found  Next physical within 3 mo: Visit date not found  Prescriber OR PCP: Elliott Elam DO  Last diagnosis associated with med order: There are no diagnoses linked to this encounter.   If protocol passes may refill for 12 months if within 3 months of last provider visit (or a total of 15 months).           Passed - A1C in last 6 months     Hemoglobin A1c   Date Value Ref Range Status   02/12/2019 7.0 (H) 3.5 - 6.0 % Final               Passed - Microalbumin in last year      Microalbumin, Random Urine   Date Value Ref Range Status   08/07/2018 0.65 0.00 - 1.99 mg/dL Final

## 2021-05-29 NOTE — TELEPHONE ENCOUNTER
Refill Approved    Rx renewed per Medication Renewal Policy. Medication was last renewed on 10/9/18.    Radha Hill, [facility] Triage/Med Refill 6/18/2019     Requested Prescriptions   Pending Prescriptions Disp Refills     losartan (COZAAR) 100 MG tablet [Pharmacy Med Name: LOSARTAN 100MG   TAB] 90 tablet 2     Sig: TAKE 1 TABLET BY MOUTH ONCE DAILY       Angiotensin Receptor Blocker Protocol Passed - 6/17/2019  2:37 PM        Passed - PCP or prescribing provider visit in past 12 months       Last office visit with prescriber/PCP: 9/18/2018 Elliott Sandra DO OR same dept: 9/18/2018 Elliott Sandra DO OR same specialty: 9/18/2018 Elliott Sandra DO  Last physical: 3/18/2019 Last MTM visit: Visit date not found   Next visit within 3 mo: Visit date not found  Next physical within 3 mo: Visit date not found  Prescriber OR PCP: Elliott Elam DO  Last diagnosis associated with med order: 1. Essential hypertension  - losartan (COZAAR) 100 MG tablet [Pharmacy Med Name: LOSARTAN 100MG   TAB]; TAKE 1 TABLET BY MOUTH ONCE DAILY  Dispense: 90 tablet; Refill: 2    If protocol passes may refill for 12 months if within 3 months of last provider visit (or a total of 15 months).             Passed - Serum potassium within the past 12 months     Lab Results   Component Value Date    Potassium 4.2 02/12/2019             Passed - Blood pressure filed in past 12 months     BP Readings from Last 1 Encounters:   03/18/19 134/80             Passed - Serum creatinine within the past 12 months     Creatinine   Date Value Ref Range Status   02/12/2019 0.92 0.70 - 1.30 mg/dL Final

## 2021-05-30 NOTE — TELEPHONE ENCOUNTER
Refill Approved    Rx renewed per Medication Renewal Policy. Medication was last renewed on 4/16/2019 for 180/0.  Last OV 3/18/2019 pre-op  Ondina Borjas, [facility] Triage/Med Refill 7/9/2019     Requested Prescriptions   Pending Prescriptions Disp Refills     metFORMIN (GLUCOPHAGE) 1000 MG tablet [Pharmacy Med Name: METFORMIN 1000MG TAB] 180 tablet 0     Sig: TAKE 1 TABLET BY MOUTH TWICE DAILY WITH MEALS       Metformin Refill Protocol Passed - 7/8/2019 10:19 AM        Passed - Blood pressure in last 12 months     BP Readings from Last 1 Encounters:   03/18/19 134/80             Passed - LFT or AST or ALT in last 12 months     Albumin   Date Value Ref Range Status   02/12/2019 4.1 3.5 - 5.0 g/dL Final     Bilirubin, Total   Date Value Ref Range Status   02/12/2019 0.9 0.0 - 1.0 mg/dL Final     Alkaline Phosphatase   Date Value Ref Range Status   02/12/2019 60 45 - 120 U/L Final     AST   Date Value Ref Range Status   02/12/2019 23 0 - 40 U/L Final     ALT   Date Value Ref Range Status   02/12/2019 40 0 - 45 U/L Final     Protein, Total   Date Value Ref Range Status   02/12/2019 6.9 6.0 - 8.0 g/dL Final                Passed - GFR or Serum Creatinine in last 6 months     GFR MDRD Non Af Amer   Date Value Ref Range Status   02/12/2019 >60 >60 mL/min/1.73m2 Final     GFR MDRD Af Amer   Date Value Ref Range Status   02/12/2019 >60 >60 mL/min/1.73m2 Final             Passed - Visit with PCP or prescribing provider visit in last 6 months or next 3 months     Last office visit with prescriber/PCP: Visit date not found OR same dept: 9/18/2018 Elliott Sandra DO OR same specialty: 9/18/2018 Elliott Sandra DO Last physical: 3/18/2019 Last MTM visit: Visit date not found         Next appt within 3 mo: Visit date not found  Next physical within 3 mo: Visit date not found  Prescriber OR PCP: Elliott Elam DO  Last diagnosis associated with med order: 1. Controlled type 2  diabetes mellitus without complication, without long-term current use of insulin (H)  - metFORMIN (GLUCOPHAGE) 1000 MG tablet [Pharmacy Med Name: METFORMIN 1000MG TAB]; TAKE 1 TABLET BY MOUTH TWICE DAILY WITH MEALS  Dispense: 180 tablet; Refill: 0     If protocol passes may refill for 12 months if within 3 months of last provider visit (or a total of 15 months).           Passed - A1C in last 6 months     Hemoglobin A1c   Date Value Ref Range Status   02/12/2019 7.0 (H) 3.5 - 6.0 % Final               Passed - Microalbumin in last year      Microalbumin, Random Urine   Date Value Ref Range Status   08/07/2018 0.65 0.00 - 1.99 mg/dL Final

## 2021-05-30 NOTE — TELEPHONE ENCOUNTER
Refill Approved    Rx renewed per Medication Renewal Policy. Medication was last renewed on 8/7/18.    Radha Hill, [facility] Triage/Med Refill 7/29/2019     Requested Prescriptions   Pending Prescriptions Disp Refills     levothyroxine (SYNTHROID, LEVOTHROID) 150 MCG tablet [Pharmacy Med Name: LEVOTHYROXIN 150MCG TAB] 90 tablet 3     Sig: TAKE 1 TABLET BY MOUTH ONCE DAILY       Thyroid Hormones Protocol Passed - 7/29/2019  9:58 AM        Passed - Provider visit in past 12 months or next 3 months     Last office visit with prescriber/PCP: 9/18/2018 Elliott Sandra DO OR same dept: 9/18/2018 Elliott Sandra DO OR same specialty: 9/18/2018 Elliott Sandra DO  Last physical: 3/18/2019 Last MTM visit: Visit date not found   Next visit within 3 mo: Visit date not found  Next physical within 3 mo: Visit date not found  Prescriber OR PCP: Elliott Elam DO  Last diagnosis associated with med order: There are no diagnoses linked to this encounter.  If protocol passes may refill for 12 months if within 3 months of last provider visit (or a total of 15 months).             Passed - TSH on file in past 12 months for patient age 12 & older     TSH   Date Value Ref Range Status   02/12/2019 2.88 0.30 - 5.00 uIU/mL Final                   amLODIPine (NORVASC) 10 MG tablet [Pharmacy Med Name: AMLODIPINE 10MG TAB] 90 tablet 3     Sig: TAKE 1 TABLET BY MOUTH ONCE DAILY       Calcium-Channel Blockers Protocol Passed - 7/29/2019  9:58 AM        Passed - PCP or prescribing provider visit in past 12 months or next 3 months     Last office visit with prescriber/PCP: 9/18/2018 Elliott Sandra DO OR same dept: 9/18/2018 Elliott Sandra DO OR same specialty: 9/18/2018 Elliott Sandra DO  Last physical: 3/18/2019 Last MTM visit: Visit date not found   Next visit within 3 mo: Visit date not found  Next physical within 3 mo: Visit date not  found  Prescriber OR PCP: Elliott Elam, DO  Last diagnosis associated with med order: There are no diagnoses linked to this encounter.  If protocol passes may refill for 12 months if within 3 months of last provider visit (or a total of 15 months).             Passed - Blood pressure filed in past 12 months     BP Readings from Last 1 Encounters:   03/18/19 134/80

## 2021-05-31 VITALS — HEIGHT: 74 IN | WEIGHT: 283.44 LBS | BODY MASS INDEX: 36.38 KG/M2

## 2021-05-31 VITALS — BODY MASS INDEX: 37.38 KG/M2 | WEIGHT: 287.2 LBS

## 2021-05-31 NOTE — PATIENT INSTRUCTIONS - HE
Hold all supplements, aspirin and NSAIDs for 7 days prior to surgery.  Follow your surgeon's direction on when to stop eating and drinking prior to surgery.  Your surgeon will be managing your pain after your surgery.

## 2021-05-31 NOTE — PROGRESS NOTES
Preoperative Exam    Scheduled Procedure: IR Drain Placement.  Surgery Date:  08/28/19  Surgery Location: [facility] at the IR clinic  [id number]  Surgeon:  Dr. Mesa    Assessment/Plan:     1. Encounter for preoperative examination for general surgical procedure  He is cleared to proceed with the IR drain placement for the lymphocele as scheduled.    2. Lymphocele  He is cleared to proceed with the IR drain placement for the lymphocele as scheduled.    3. Urinary retention  He will have a lymphocele drained which will hopefully help with urinary retention symptoms.    4. Prostate cancer (H)  He is currently in the process of undergoing radiation treatment for this.  He will continue to follow closely with urology.    5. Bladder tumor  With history of bladder tumor resection on 2/28/2019.    6. Controlled type 2 diabetes mellitus without complication, without long-term current use of insulin (H)  Well-controlled on current medication.  He will hold metformin on the morning of surgery.  - Glycosylated Hemoglobin A1c  - Microalbumin, Random Urine    7. Hypothyroidism, unspecified type  Continue levothyroxine.  His TSH level was normal on 2/12/2019.    8. Other hyperlipidemia  Continue simvastatin 40 mg daily    9. LILLIANA (obstructive sleep apnea)  Continue CPAP    10. Class 2 severe obesity due to excess calories with serious comorbidity and body mass index (BMI) of 35.0 to 35.9 in adult (H)    12. Hypertension  Well-controlled on amlodipine, atenolol and losartan.  He is going to hold the losartan on the morning of surgery.    I personally reviewed the recent CT scan results as well as the last urology consultation note from 7/5/2019.  I also personally reviewed lab results going back over the past 7 months.    Surgical Procedure Risk: Low (reported cardiac risk generally < 1%)  Have you had prior anesthesia?: Yes  Have you or any family members had a previous anesthesia reaction:  No  Do you or  any family members have a history of a clotting or bleeding disorder?: No  Cardiac Risk Assessment: no increased risk for major cardiac complications    APPROVAL GIVEN to proceed with proposed procedure, without further diagnostic evaluation        Functional Status: Independent  Patient plans to recover at home with family.     Subjective:      Wang Balderrama is a 59 y.o. male who presents for a preoperative consultation.  He has a medical history significant for prostate cancer, status post robotic prostatectomy on 3/19/2019, bladder tumor, status post bladder tumor resection on 2/28/2019, hypertension, type 2 diabetes, hyperlipidemia, obesity and obstructive sleep apnea.  Earlier this month she was having difficulty with urinary retention.  He had a CT scan which showed an 8.4 x 5.1 cm fluid collection in the left pelvic region.  The repeat scan showed that this diminished slightly to 5.4 x 3.4 cm.  It was recommended that he undergo an IR drain placement for treatment.  He currently has a catheter.  He feels like his other medical issues are stable.  He is undergone a few radiation treatments since the beginning of this month.  Unfortunately, the CT scan results show that there is no communication with the bladder and the fluid collection.    All other systems reviewed and are negative, other than those listed in the HPI.    Pertinent History  Do you have difficulty breathing or chest pain after walking up a flight of stairs: No  History of obstructive sleep apnea: Yes: On CPAP  Steroid use in the last 6 months: No  Frequent Aspirin/NSAID use: Stopped ASA 81mg on 08/22/19.  Prior Blood Transfusion: No  Prior Blood Transfusion Reaction: No  If for some reason prior to, during or after the procedure, if it is medically indicated, would you be willing to have a blood transfusion?:  There is no transfusion refusal.    Current Outpatient Medications   Medication Sig Dispense Refill     allopurinol (ZYLOPRIM) 100  MG tablet Take 1 tablet (100 mg total) by mouth daily. 90 tablet 3     amLODIPine (NORVASC) 10 MG tablet TAKE 1 TABLET BY MOUTH ONCE DAILY 90 tablet 2     aspirin 81 MG EC tablet Take 81 mg by mouth daily.       atenolol (TENORMIN) 100 MG tablet TAKE 1 TABLET BY MOUTH ONCE DAILY 90 tablet 3     levothyroxine (SYNTHROID, LEVOTHROID) 150 MCG tablet TAKE 1 TABLET BY MOUTH ONCE DAILY 90 tablet 2     losartan (COZAAR) 100 MG tablet TAKE 1 TABLET BY MOUTH ONCE DAILY 90 tablet 2     metFORMIN (GLUCOPHAGE) 1000 MG tablet Take 1 tablet (1,000 mg total) by mouth 2 (two) times a day with meals. 180 tablet 3     simvastatin (ZOCOR) 40 MG tablet TAKE 1 TABLET BY MOUTH AT BEDTIME 90 tablet 3     No current facility-administered medications for this visit.         No Known Allergies    Patient Active Problem List   Diagnosis     Obesity     Hypothyroidism     Hyperlipidemia     Hypertension     Controlled type 2 diabetes mellitus without complication (H)     Gout     Elevated PSA     Abnormal prostate biopsy     LILLIANA (obstructive sleep apnea)     Obesity (BMI 35.0-39.9) with comorbidity (H)       No past medical history on file.    No past surgical history on file.    Social History     Socioeconomic History     Marital status:      Spouse name: Not on file     Number of children: Not on file     Years of education: Not on file     Highest education level: Not on file   Occupational History     Not on file   Social Needs     Financial resource strain: Not on file     Food insecurity:     Worry: Not on file     Inability: Not on file     Transportation needs:     Medical: Not on file     Non-medical: Not on file   Tobacco Use     Smoking status: Never Smoker     Smokeless tobacco: Never Used   Substance and Sexual Activity     Alcohol use: Yes     Comment: rare     Drug use: No     Sexual activity: Not on file   Lifestyle     Physical activity:     Days per week: Not on file     Minutes per session: Not on file     Stress:  "Not on file   Relationships     Social connections:     Talks on phone: Not on file     Gets together: Not on file     Attends Congregational service: Not on file     Active member of club or organization: Not on file     Attends meetings of clubs or organizations: Not on file     Relationship status: Not on file     Intimate partner violence:     Fear of current or ex partner: Not on file     Emotionally abused: Not on file     Physically abused: Not on file     Forced sexual activity: Not on file   Other Topics Concern     Not on file   Social History Narrative     Not on file             Objective:     Vitals:    08/26/19 1503   BP: 136/80   Pulse: 72   Resp: 16   Temp: 97.9  F (36.6  C)   TempSrc: Oral   Weight: (!) 275 lb 9 oz (125 kg)   Height: 6' 1.5\" (1.867 m)         Physical Exam:  General Appearance: Alert, cooperative, no distress, appears stated age  Head: Normocephalic, without obvious abnormality, atraumatic  Eyes: PERRL, conjunctiva/corneas clear, EOM's intact  Ears: Normal TM's and external ear canals, both ears  Nose: Nares normal, septum midline,mucosa normal, no drainage  Throat: Lips, mucosa, and tongue normal; teeth and gums normal  Neck: Supple, symmetrical, trachea midline, no adenopathy;  thyroid: not enlarged, symmetric, no tenderness/mass/nodules  Back: Symmetric, no curvature, ROM normal, no CVA tenderness  Lungs: Clear to auscultation bilaterally, respirations unlabored  Heart: Regular rate and rhythm, S1 and S2 normal, no murmur, rub, or gallop,  Abdomen: Soft, non-tender, bowel sounds active all four quadrants,  no masses, no organomegaly  Musculoskeletal: Normal range of motion. No joint swelling or deformity.   Extremities: Extremities normal, atraumatic, no cyanosis or edema  Skin: Skin color, texture, turgor normal, no rashes or lesions  Lymph nodes: Cervical, supraclavicular, and axillary nodes normal  Neurologic: He is alert.  Normal speech.  No focal deficits.  Normal deep tendon " reflexes.   Psychiatric: He has a normal mood and affect.       There are no Patient Instructions on file for this visit.        Labs:  Labs pending at this time.  Results will be reviewed when available.    Immunization History   Administered Date(s) Administered     Influenza, inj, historic,unspecified 11/02/2007     Influenza,seasonal quad, PF, =/> 6months 10/03/2013     Tdap 05/05/2011           Electronically signed by Elliott Elam DO 08/26/19 3:06 PM

## 2021-05-31 NOTE — TELEPHONE ENCOUNTER
Refill Approved    Rx renewed per Medication Renewal Policy. Medication was last renewed on 8/28/18.    Radha Hill, [facility] Triage/Med Refill 8/26/2019     Requested Prescriptions   Pending Prescriptions Disp Refills     simvastatin (ZOCOR) 40 MG tablet [Pharmacy Med Name: SIMVASTATIN 40MG    TAB] 90 tablet 3     Sig: TAKE 1 TABLET BY MOUTH AT BEDTIME       Statins Refill Protocol (Hmg CoA Reductase Inhibitors) Passed - 8/26/2019 11:35 AM        Passed - PCP or prescribing provider visit in past 12 months      Last office visit with prescriber/PCP: 9/18/2018 Elliott Sandra DO OR same dept: 9/18/2018 Elliott Sandra DO OR same specialty: 9/18/2018 Elliott Sandra DO  Last physical: 3/18/2019 Last MTM visit: Visit date not found   Next visit within 3 mo: Visit date not found  Next physical within 3 mo: 8/26/2019 Elliott Sandra DO  Prescriber OR PCP: Elliott Elam DO  Last diagnosis associated with med order: There are no diagnoses linked to this encounter.  If protocol passes may refill for 12 months if within 3 months of last provider visit (or a total of 15 months).

## 2021-06-01 VITALS — BODY MASS INDEX: 36.89 KG/M2 | HEIGHT: 74 IN | WEIGHT: 287.44 LBS

## 2021-06-02 VITALS — WEIGHT: 288.38 LBS | BODY MASS INDEX: 37.01 KG/M2 | HEIGHT: 74 IN

## 2021-06-02 VITALS — WEIGHT: 288.56 LBS | HEIGHT: 74 IN | BODY MASS INDEX: 37.03 KG/M2

## 2021-06-02 VITALS — HEIGHT: 74 IN | BODY MASS INDEX: 32.92 KG/M2 | WEIGHT: 256.5 LBS

## 2021-06-03 VITALS — HEIGHT: 74 IN | WEIGHT: 275.56 LBS | BODY MASS INDEX: 35.36 KG/M2

## 2021-06-03 NOTE — TELEPHONE ENCOUNTER
Refill Approved    Rx renewed per Medication Renewal Policy. Medication was last renewed on 11/28/2018.       Seth Easley, [facility] Triage/Med Refill 11/26/2019     Requested Prescriptions   Pending Prescriptions Disp Refills     allopurinol (ZYLOPRIM) 100 MG tablet [Pharmacy Med Name: ALLOPURINOL 100MG TAB] 90 tablet 0     Sig: TAKE 1 TABLET BY MOUTH ONCE DAILY       Allopurinol/Febuxostat Refill Protocol  Passed - 11/25/2019  8:08 AM        Passed - LFT or AST or ALT in last 12 months     Albumin   Date Value Ref Range Status   02/12/2019 4.1 3.5 - 5.0 g/dL Final     Bilirubin, Total   Date Value Ref Range Status   02/12/2019 0.9 0.0 - 1.0 mg/dL Final     Alkaline Phosphatase   Date Value Ref Range Status   02/12/2019 60 45 - 120 U/L Final     AST   Date Value Ref Range Status   02/12/2019 23 0 - 40 U/L Final     ALT   Date Value Ref Range Status   02/12/2019 40 0 - 45 U/L Final     Protein, Total   Date Value Ref Range Status   02/12/2019 6.9 6.0 - 8.0 g/dL Final                Passed - Visit with PCP or prescribing provider visit in past 12 months     Last office visit with prescriber/PCP: 9/18/2018 Elliott Sandra DO OR same dept: Visit date not found OR same specialty: 9/18/2018 Elliott Sandra DO  Last physical: 8/26/2019 Last MTM visit: Visit date not found   Next visit within 3 mo: Visit date not found  Next physical within 3 mo: Visit date not found  Prescriber OR PCP: Elliott Elam DO  Last diagnosis associated with med order: 1. Gout  - allopurinol (ZYLOPRIM) 100 MG tablet [Pharmacy Med Name: ALLOPURINOL 100MG TAB]; TAKE 1 TABLET BY MOUTH ONCE DAILY  Dispense: 90 tablet; Refill: 0    If protocol passes may refill for 12 months if within 3 months of last provider visit (or a total of 15 months).

## 2021-06-04 VITALS
HEART RATE: 60 BPM | WEIGHT: 280.9 LBS | DIASTOLIC BLOOD PRESSURE: 88 MMHG | SYSTOLIC BLOOD PRESSURE: 148 MMHG | BODY MASS INDEX: 36.05 KG/M2 | HEIGHT: 74 IN | RESPIRATION RATE: 12 BRPM

## 2021-06-05 VITALS
OXYGEN SATURATION: 98 % | DIASTOLIC BLOOD PRESSURE: 74 MMHG | WEIGHT: 280 LBS | HEIGHT: 74 IN | BODY MASS INDEX: 35.94 KG/M2 | SYSTOLIC BLOOD PRESSURE: 144 MMHG | HEART RATE: 62 BPM

## 2021-06-06 NOTE — TELEPHONE ENCOUNTER
RN cannot approve Refill Request    RN can NOT refill this medication Protocol failed and NO refill given.      Radha Hill, [facility] Triage/Med Refill 3/17/2020    Requested Prescriptions   Pending Prescriptions Disp Refills     losartan (COZAAR) 100 MG tablet [Pharmacy Med Name: Losartan Potassium 100 MG Oral Tablet] 90 tablet 0     Sig: Take 1 tablet by mouth once daily       Angiotensin Receptor Blocker Protocol Failed - 3/16/2020 11:13 AM        Failed - Serum potassium within the past 12 months     No results found for: LN-POTASSIUM          Failed - Serum creatinine within the past 12 months     Creatinine   Date Value Ref Range Status   02/12/2019 0.92 0.70 - 1.30 mg/dL Final             Passed - PCP or prescribing provider visit in past 12 months       Last office visit with prescriber/PCP: 9/18/2018 Elliott Sandra DO OR same dept: Visit date not found OR same specialty: 9/18/2018 Elliott Sandra DO  Last physical: 8/26/2019 Last MTM visit: Visit date not found   Next visit within 3 mo: Visit date not found  Next physical within 3 mo: Visit date not found  Prescriber OR PCP: Elliott Elam DO  Last diagnosis associated with med order: 1. Essential hypertension  - losartan (COZAAR) 100 MG tablet [Pharmacy Med Name: Losartan Potassium 100 MG Oral Tablet]; TAKE 1 TABLET BY MOUTH ONCE DAILY  Dispense: 90 tablet; Refill: 0    If protocol passes may refill for 12 months if within 3 months of last provider visit (or a total of 15 months).             Passed - Blood pressure filed in past 12 months     BP Readings from Last 1 Encounters:   08/26/19 136/80

## 2021-06-06 NOTE — TELEPHONE ENCOUNTER
RN cannot approve Refill Request    RN can NOT refill this medication PCP messaged that patient is overdue for Labs and Protocol failed and NO refill given.  Juliana Gomez, [facility] Triage/Med Refill 2/25/2020    Requested Prescriptions   Pending Prescriptions Disp Refills     allopurinoL (ZYLOPRIM) 100 MG tablet [Pharmacy Med Name: Allopurinol 100 MG Oral Tablet] 90 tablet 0     Sig: TAKE 1 TABLET BY MOUTH ONCE DAILY       Allopurinol/Febuxostat Refill Protocol  Failed - 2/24/2020 11:16 AM        Failed - LFT or AST or ALT in last 12 months     Albumin   Date Value Ref Range Status   02/12/2019 4.1 3.5 - 5.0 g/dL Final     Bilirubin, Total   Date Value Ref Range Status   02/12/2019 0.9 0.0 - 1.0 mg/dL Final     Alkaline Phosphatase   Date Value Ref Range Status   02/12/2019 60 45 - 120 U/L Final     AST   Date Value Ref Range Status   02/12/2019 23 0 - 40 U/L Final     ALT   Date Value Ref Range Status   02/12/2019 40 0 - 45 U/L Final     Protein, Total   Date Value Ref Range Status   02/12/2019 6.9 6.0 - 8.0 g/dL Final                Passed - Visit with PCP or prescribing provider visit in past 12 months     Last office visit with prescriber/PCP: 9/18/2018 Elliott Sandra DO OR same dept: Visit date not found OR same specialty: 9/18/2018 Elliott Sandra DO  Last physical: 8/26/2019 Last MTM visit: Visit date not found   Next visit within 3 mo: Visit date not found  Next physical within 3 mo: Visit date not found  Prescriber OR PCP: Elliott Elam DO  Last diagnosis associated with med order: 1. Gout  - allopurinoL (ZYLOPRIM) 100 MG tablet [Pharmacy Med Name: Allopurinol 100 MG Oral Tablet]; TAKE 1 TABLET BY MOUTH ONCE DAILY  Dispense: 90 tablet; Refill: 0    If protocol passes may refill for 12 months if within 3 months of last provider visit (or a total of 15 months).

## 2021-06-08 NOTE — TELEPHONE ENCOUNTER
RN cannot approve Refill Request    RN can NOT refill this medication Protocol failed and NO refill given.       Radha Hill, [facility] Triage/Med Refill 5/7/2020    Requested Prescriptions   Pending Prescriptions Disp Refills     levothyroxine (SYNTHROID, LEVOTHROID) 150 MCG tablet [Pharmacy Med Name: Levothyroxine Sodium 150 MCG Oral Tablet] 90 tablet 3     Sig: Take 1 tablet by mouth once daily       Thyroid Hormones Protocol Failed - 5/5/2020  2:30 AM        Failed - TSH on file in past 12 months for patient age 12 & older     TSH   Date Value Ref Range Status   02/12/2019 2.88 0.30 - 5.00 uIU/mL Final                   Passed - Provider visit in past 12 months or next 3 months     Last office visit with prescriber/PCP: 9/18/2018 Elliott Sandra DO OR same dept: Visit date not found OR same specialty: 9/18/2018 Elliott Sandra DO  Last physical: 8/26/2019 Last MTM visit: Visit date not found   Next visit within 3 mo: Visit date not found  Next physical within 3 mo: Visit date not found  Prescriber OR PCP: Elliott Elam DO  Last diagnosis associated with med order: 1. Hypothyroidism, unspecified type  - levothyroxine (SYNTHROID, LEVOTHROID) 150 MCG tablet [Pharmacy Med Name: Levothyroxine Sodium 150 MCG Oral Tablet]; TAKE 1 TABLET BY MOUTH ONCE DAILY  Dispense: 90 tablet; Refill: 0    If protocol passes may refill for 12 months if within 3 months of last provider visit (or a total of 15 months).

## 2021-06-08 NOTE — TELEPHONE ENCOUNTER
RN cannot approve Refill Request    RN can NOT refill this medication Protocol failed and NO refill given    Radha Hill, [facility] Triage/Med Refill 5/27/2020    Requested Prescriptions   Pending Prescriptions Disp Refills     allopurinoL (ZYLOPRIM) 100 MG tablet [Pharmacy Med Name: Allopurinol 100 MG Oral Tablet] 90 tablet 3     Sig: Take 1 tablet by mouth once daily       Allopurinol/Febuxostat Refill Protocol  Failed - 5/25/2020  5:06 PM        Failed - LFT or AST or ALT in last 12 months     Albumin   Date Value Ref Range Status   02/12/2019 4.1 3.5 - 5.0 g/dL Final     Bilirubin, Total   Date Value Ref Range Status   02/12/2019 0.9 0.0 - 1.0 mg/dL Final     Alkaline Phosphatase   Date Value Ref Range Status   02/12/2019 60 45 - 120 U/L Final     AST   Date Value Ref Range Status   02/12/2019 23 0 - 40 U/L Final     ALT   Date Value Ref Range Status   02/12/2019 40 0 - 45 U/L Final     Protein, Total   Date Value Ref Range Status   02/12/2019 6.9 6.0 - 8.0 g/dL Final                Passed - Visit with PCP or prescribing provider visit in past 12 months     Last office visit with prescriber/PCP: 9/18/2018 Elliott Sandra DO OR same dept: Visit date not found OR same specialty: 9/18/2018 Elliott Sandra DO  Last physical: 8/26/2019 Last MTM visit: Visit date not found   Next visit within 3 mo: Visit date not found  Next physical within 3 mo: Visit date not found  Prescriber OR PCP: Elliott Elam DO  Last diagnosis associated with med order: 1. Gout  - allopurinoL (ZYLOPRIM) 100 MG tablet [Pharmacy Med Name: Allopurinol 100 MG Oral Tablet]; TAKE 1 TABLET BY MOUTH ONCE DAILY  Dispense: 90 tablet; Refill: 0    If protocol passes may refill for 12 months if within 3 months of last provider visit (or a total of 15 months).

## 2021-06-09 NOTE — TELEPHONE ENCOUNTER
Refill Approved    Rx renewed per Medication Renewal Policy. Medication was last renewed on 4/7/20.    Radha Hill, [facility] Connection Triage/Med Refill 7/13/2020     Requested Prescriptions   Pending Prescriptions Disp Refills     atenoloL (TENORMIN) 100 MG tablet [Pharmacy Med Name: Atenolol 100 MG Oral Tablet] 90 tablet 0     Sig: Take 1 tablet by mouth once daily       Beta-Blockers Refill Protocol Passed - 7/12/2020 10:22 AM        Passed - PCP or prescribing provider visit in past 12 months or next 3 months     Last office visit with prescriber/PCP: Visit date not found OR same dept: Visit date not found OR same specialty: 9/18/2018 Arianna Elam, Elliott Rich DO  Last physical: Visit date not found Last MTM visit: Visit date not found   Next visit within 3 mo: Visit date not found  Next physical within 3 mo: Visit date not found  Prescriber OR PCP: Vianca Sanchez MD  Last diagnosis associated with med order: 1. Hypertension  - atenoloL (TENORMIN) 100 MG tablet [Pharmacy Med Name: Atenolol 100 MG Oral Tablet]; Take 1 tablet by mouth once daily  Dispense: 90 tablet; Refill: 0    If protocol passes may refill for 12 months if within 3 months of last provider visit (or a total of 15 months).             Passed - Blood pressure filed in past 12 months     BP Readings from Last 1 Encounters:   08/26/19 136/80

## 2021-06-09 NOTE — TELEPHONE ENCOUNTER
RN cannot approve Refill Request    RN can NOT refill this medication PCP messaged that patient is overdue for Labs and Office Visit. Last office visit: 9/18/2018 Elliott Sandra DO Last Physical: 8/26/2019 Last MTM visit: Visit date not found Last visit same specialty: 9/18/2018 Elliott Sandra DO.  Next visit within 3 mo: Visit date not found  Next physical within 3 mo: Visit date not found      Leanne Early, [facility] Triage/Med Refill 7/12/2020    Requested Prescriptions   Pending Prescriptions Disp Refills     metFORMIN (GLUCOPHAGE) 1000 MG tablet [Pharmacy Med Name: metFORMIN HCl 1000 MG Oral Tablet] 180 tablet 0     Sig: TAKE 1 TABLET BY MOUTH TWICE DAILY WITH MEALS       Metformin Refill Protocol Failed - 7/12/2020 10:22 AM        Failed - LFT or AST or ALT in last 12 months     Albumin   Date Value Ref Range Status   02/12/2019 4.1 3.5 - 5.0 g/dL Final     Bilirubin, Total   Date Value Ref Range Status   02/12/2019 0.9 0.0 - 1.0 mg/dL Final     Alkaline Phosphatase   Date Value Ref Range Status   02/12/2019 60 45 - 120 U/L Final     AST   Date Value Ref Range Status   02/12/2019 23 0 - 40 U/L Final     ALT   Date Value Ref Range Status   02/12/2019 40 0 - 45 U/L Final     Protein, Total   Date Value Ref Range Status   02/12/2019 6.9 6.0 - 8.0 g/dL Final                Failed - GFR or Serum Creatinine in last 6 months     GFR MDRD Non Af Amer   Date Value Ref Range Status   02/12/2019 >60 >60 mL/min/1.73m2 Final     GFR MDRD Af Amer   Date Value Ref Range Status   02/12/2019 >60 >60 mL/min/1.73m2 Final             Failed - Visit with PCP or prescribing provider visit in last 6 months or next 3 months     Last office visit with prescriber/PCP: Visit date not found OR same dept: Visit date not found OR same specialty: 9/18/2018 Elliott Sandra DO Last physical: Visit date not found Last MTM visit: Visit date not found         Next appt within 3 mo: Visit date  not found  Next physical within 3 mo: Visit date not found  Prescriber OR PCP: Elliott Elam DO  Last diagnosis associated with med order: 1. Controlled type 2 diabetes mellitus without complication, without long-term current use of insulin (H)  - metFORMIN (GLUCOPHAGE) 1000 MG tablet [Pharmacy Med Name: metFORMIN HCl 1000 MG Oral Tablet]; TAKE 1 TABLET BY MOUTH TWICE DAILY WITH MEALS  Dispense: 180 tablet; Refill: 0     If protocol passes may refill for 12 months if within 3 months of last provider visit (or a total of 15 months).           Failed - A1C in last 6 months     Hemoglobin A1c   Date Value Ref Range Status   08/26/2019 7.5 (H) 3.5 - 6.0 % Final               Passed - Blood pressure in last 12 months     BP Readings from Last 1 Encounters:   08/26/19 136/80             Passed - Microalbumin in last year      Microalbumin, Random Urine   Date Value Ref Range Status   08/26/2019 16.92 (H) 0.00 - 1.99 mg/dL Final

## 2021-06-10 NOTE — TELEPHONE ENCOUNTER
Due to be seen    Rx renewed per Medication Renewal Policy. Medication was last renewed on 8/26/19.    Radha Hill, [facility] Triage/Med Refill 8/25/2020     Requested Prescriptions   Pending Prescriptions Disp Refills     simvastatin (ZOCOR) 40 MG tablet [Pharmacy Med Name: Simvastatin 40 MG Oral Tablet] 90 tablet 0     Sig: TAKE 1 TABLET BY MOUTH AT BEDTIME       Statins Refill Protocol (Hmg CoA Reductase Inhibitors) Passed - 8/23/2020  8:04 AM        Passed - PCP or prescribing provider visit in past 12 months      Last office visit with prescriber/PCP: 9/18/2018 Elliott Sandra DO OR same dept: Visit date not found OR same specialty: 9/18/2018 Elliott Sandra DO  Last physical: 8/26/2019 Last MTM visit: Visit date not found   Next visit within 3 mo: Visit date not found  Next physical within 3 mo: Visit date not found  Prescriber OR PCP: Elliott Elam DO  Last diagnosis associated with med order: 1. Other hyperlipidemia  - simvastatin (ZOCOR) 40 MG tablet [Pharmacy Med Name: Simvastatin 40 MG Oral Tablet]; TAKE 1 TABLET BY MOUTH AT BEDTIME  Dispense: 90 tablet; Refill: 0    If protocol passes may refill for 12 months if within 3 months of last provider visit (or a total of 15 months).

## 2021-06-11 NOTE — PROGRESS NOTES
Assessment:     1. Well adult exam     2. Controlled type 2 diabetes mellitus without complication  Glycosylated Hemoglobin A1c    Microalbumin, Random Urine   3. Unspecified essential hypertension  losartan (COZAAR) 100 MG tablet   4. Obesity     5. Other hyperlipidemia  Comprehensive Metabolic Panel    Lipid Cascade   6. Gout  Uric Acid   7. Abnormal prostate biopsy  PSA (Prostatic-Specific Antigen), Annual Screen   8. Hypothyroidism  Thyroid [place]   9. LILLIANA (obstructive sleep apnea)  Ambulatory referral to Sleep Medicine       The following high BMI interventions were performed this visit: encouragement to exercise and lifestyle education regarding diet     Plan:      I recommended that he exercise on a regular basis and eat healthy foods.  We are going to check the above fasting labs.  I recommended he schedule follow-up appointment with urology to discuss having another biopsy and to help with management regarding the abnormal prostatic biopsy results.  I also recommended that he be seen in the sleep clinic since he is not using CPAP and his wife has commented him about apneic episodes.  His blood pressures currently well controlled.  Will get a better idea of his diabetes control once the A1c result is back.  If the TSH levels off we can adjust his thyroid medication if necessary.     All questions answered.  Discussed healthy lifestyle modifications.  Follow up as needed.     Subjective:      Wang Balderrama is a 57 y.o. male who presents for an annual exam. The patient reports that there is not domestic violence in his life.  He has a medical history significant for type 2 diabetes, hypertension, hyperlipidemia, hypothyroidism, obesity, gout, obstructive sleep apnea (not tolerating CPAP) and a history of elevated PSA levels with abnormal prostate biopsy results.  He has seen urology a few times last fall and had 2 separate biopsies of his prostate.  One of the biopsies checked on each of these occasions  showed evidence of high-grade intraepithelial neoplasia.  He reports that they wanted him to recheck his PSA level in 6 months and consider having another biopsy at that time.  He has not been back into see them since 10/31/16.    On 8/4/16 his hemoglobin A1c was 6.4%, uric acid 8.3, TSH 0.25 with a normal free T4.  On 4/28/16 the LDL cholesterol was 104, CMP normal with the exception of an elevated glucose of 165, hemoglobin A1c 6.8%    He has been under more stress this summer.  He and his wife are living with his sister-in-law during the summer months and they are planning for his daughter's wedding.    Social history: .  He works as a [employer]  at [employer].    Healthy Habits:   Regular Exercise: Yes swim  Sunscreen Use: No  Healthy Diet: Yes  Dental Visits Regularly: No  Seat Belt: Yes  Sexually active: Yes  Monthly Self Testicular Exams:  No  Hemoccults: No  Flex Sig: No  Colonoscopy: 06/30/2016 recheck 5 years  Lipid Profile: 04/28/16  Glucose Screen: 04/28/16  Prevention of Osteoporosis: No  Last Dexa: N/A  Guns at Home:  N/A      Immunization History   Administered Date(s) Administered     Influenza, inj, historic 11/02/2007     Tdap 05/05/2011     Immunization status: up to date and documented.    No exam data present    Current Outpatient Prescriptions   Medication Sig Dispense Refill     allopurinol (ZYLOPRIM) 100 MG tablet TAKE ONE TABLET BY MOUTH ONCE DAILY 90 tablet 3     amLODIPine (NORVASC) 5 MG tablet TAKE ONE TABLET BY MOUTH ONCE DAILY 90 tablet 3     atenolol (TENORMIN) 100 MG tablet Take 1 tablet (100 mg total) by mouth daily. Take 2 tablets by mouth daily 90 tablet 3     hydroCHLOROthiazide (HYDRODIURIL) 25 MG tablet TAKE ONE TABLET BY MOUTH ONCE DAILY 90 tablet 3     indomethacin (INDOCIN) 50 MG capsule TAKE ONE CAPSULE BY MOUTH THREE TIMES DAILY AS NEEDED 30 capsule 2     levothyroxine (SYNTHROID) 150 MCG tablet Take 1 tablet (150 mcg total) by mouth  "daily. 90 tablet 3     losartan (COZAAR) 100 MG tablet Take 1 tablet by mouth daily 90 tablet 3     simvastatin (ZOCOR) 40 MG tablet TAKE ONE TABLET BY MOUTH ONCE DAILY AT BEDTIME 90 tablet 3     aspirin 81 MG EC tablet Take 81 mg by mouth daily.       No current facility-administered medications for this visit.      No past medical history on file.  No past surgical history on file.  Review of patient's allergies indicates no known allergies.  Family History   Problem Relation Age of Onset     Hypertension Sister      Diabetes Sister      Social History     Social History     Marital status:      Spouse name: N/A     Number of children: N/A     Years of education: N/A     Occupational History     Not on file.     Social History Main Topics     Smoking status: Never Smoker     Smokeless tobacco: Never Used     Alcohol use Yes      Comment: rare     Drug use: No     Sexual activity: Not on file     Other Topics Concern     Not on file     Social History Narrative       Review of Systems  General:  Denies problem  Eyes: Denies problem  Ears/Nose/Throat: Denies problem  Cardiovascular: Denies problem  Respiratory:  Denies problem  Gastrointestinal:  Denies problem  Genitourinary: Denies problem  Musculoskeletal:  Denies problem  Skin: Denies problem  Neurologic: Denies problem  Psychiatric: Denies problem  Endocrine: Denies problem  Heme/Lymphatic: Denies problem   Allergic/Immunologic: Denies problem        Objective:     Vitals:    07/20/17 1026   BP: 132/80   Pulse: (!) 56   Resp: 16   Temp: 98  F (36.7  C)   TempSrc: Oral   Weight: (!) 283 lb 7 oz (128.6 kg)   Height: 6' 1.5\" (1.867 m)     Body mass index is 36.89 kg/(m^2).    Physical  General Appearance: Alert, cooperative, no distress, appears stated age  Head: Normocephalic, without obvious abnormality, atraumatic  Eyes: PERRL, conjunctiva/corneas clear, EOM's intact  Ears: Normal TM's and external ear canals, both ears  Nose: Nares normal, septum " midline,mucosa normal, no drainage  Throat: Lips, mucosa, and tongue normal; teeth and gums normal  Neck: Supple, symmetrical, trachea midline, no adenopathy;  thyroid: not enlarged, symmetric, no tenderness/mass/nodules  Back: Symmetric, no curvature, ROM normal, no CVA tenderness  Lungs: Clear to auscultation bilaterally, respirations unlabored  Heart: Regular rate and rhythm, S1 and S2 normal, no murmur, rub, or gallop,  Abdomen: Soft, non-tender, bowel sounds active all four quadrants,  no masses, no organomegaly  Genitourinary: Penis normal. Right testis is descended. Left testis is descended. No hernias palpated  Rectal: Prostate is smooth, nontender and slightly enlarged.   Musculoskeletal: Normal range of motion. No joint swelling or deformity.   Extremities: Extremities normal, atraumatic, no cyanosis or edema  Skin: Skin color, texture, turgor normal, no rashes or lesions  Lymph nodes: Cervical, supraclavicular, and axillary nodes normal  Neurologic: He is alert.  Normal speech.  No focal deficits.  Normal deep tendon reflexes.   Psychiatric: He has a normal mood and affect.        No results found for this or any previous visit (from the past 168 hour(s)).

## 2021-06-12 NOTE — PROGRESS NOTES
Assessment: Erick is here today for a consult regarding his diabetes management. He arrived to today's appointment accompanied by his wife and both his meter and log book with him. Per his recollection Erick stated he believed he had been diagnosed with diabetes 5 or 6 years ago but does not remember having received any education regarding the diagnosis. He said he was actually surprised when he received the information that his A1c was high and wanted to come today to receive education on how to lower it.  Education today was then provided to Erick on the following topics: Pathophysiology of DM & difference between T1 and 2, how diabetes is diagnosed and significance of A1c, BG goal and targets per ADA, MOA of Metformin, Carb identification, portions and recommendations for intake with meals. Label reading, meal planning and snack options - also discussed were the benefits / effects exercise has on BG. He has been testing 3- 4 times daily and has taken Metformin each day as prescribed (500 mg BID)    BG summary: 1 week reviewed   -  FB, 211, 193, 188, 202, 215, 208, 215  Post Breakfast: 200, 215, 189, 229, 226, 213, 197, 243  Pre lunch : X, 144, 181, X, 213, X, 197, 163  Post lunch: X, 164, 208, 282, 260, X, 250, 264  Pre dinner: 174  Post Dinner: 188, 243, 211, 291    Eating 3 meals each day plus snacks and swims 0.5 mi twice weekly - occupation is physically active (walking)  Daughter is getting  in 3 days (stressor) plus he and his wife have given their townhouse to one of their children and their family who is visiting from [place] for the summer so are staying with wife's sister and her family.    Reported he had lost 60 pounds and A1c came down to 5-6% range. asked if weight gain could have contributed to increase in A1c was informed that added weight contributes to insulin resistance so this indeed could be a factor.      Plan: Based upon BG data reviewed today would recommend increasing Metformin  to maximum dose of 2000 mg per day - informed patient that this dose was actually found to be most clinically effective - communicated this recommendation to PCP who was in favor of increase. Instructed Erick to begin checking his blood sugars 3 times daily and to monitor CHO intake at meals following the parameters discussed. If no change in 3 months at next A1c would recommend considering initiating GLP1    Subjective and Objective:      Wang Balderrama is referred by Dr Arianna Elam for Diabetes Education.     Lab Results   Component Value Date    HGBA1C 9.7 (H) 07/20/2017     Wt (!) 287 lb 3.2 oz (130.3 kg)  BMI 37.38 kg/m2      Current diabetes medications:  Metformin 500 mg BID    Goals       Activity            Incorporate 30 minutes activity into each day  8/9/2017          Medications            Take diabetes medications as prescribed   8/9/2017            Nutrition             Eat 3 balanced meals each day - Monitor carb intake and limit to 4-5 choices (60-75 grams) per meal    Do not wait longer than 4-5 hours to eat something  Snacks  1-2 choices  ( 15 - 30 grams)   8/9/2017              Follow up:   Primary care visit  3 mo A1c check      Education:     Monitoring   Meter (per above goals): Assessed, Discussed and Competent  Monitoring: Assessed and Discussed  BG goals: Assessed, Discussed and Literature provided    Nutrition Management  Nutrition Management: Assessed, Discussed and Literature provided  Weight: Assessed and Discussed  Portions/Balance: Assessed, Discussed and Literature provided  Carb ID/Count: Assessed, Discussed and Literature provided  Label Reading: Assessed, Discussed and Literature provided  Heart Healthy Fats: Assessed, Discussed and Literature provided  Menu Planning: Assessed, Discussed and Literature provided  Dining Out: Assessed, Discussed and Literature provided  Physical Activity: Assessed and Discussed  Medications: Assessed, Discussed and Needs instruction/review at  follow-up  Orals: Assessed, Discussed and Needs instruction/review at follow-up  Injected Medications: Discussed       Diabetes Disease Process: Assessed and Discussed    Acute Complications: Prevent, Detect, Treat:  Hypoglycemia: Discussed  Hyperglycemia: Assessed and Discussed  Sick Days: Literature provided  Driving: Not addressed    Chronic Complications  Foot Care:Discussed  Skin Care: Discussed  Eye: Discussed  ABC: Assessed and Discussed  Teeth:Not addressed  Goal Setting and Problem Solving: Assessed and Discussed  Barriers: Assessed and Discussed  Psychosocial Adjustments: Assessed and Discussed      Time spent with the patient: 60 minutes for diabetes education and counseling.   Previous Education: no  Visit Type:JARVIS Maldonado RN CDE  Diabetes education  8/9/2017

## 2021-06-12 NOTE — PROGRESS NOTES
I spoke to to him about his high hemoglobin A1c result and TSH result.  He was given a prescription for metformin and a new prescription for a lower dose of levothyroxine.  He is also given a referral to see her diabetes educator and will be making an appointment with her soon.

## 2021-06-12 NOTE — PROGRESS NOTES
[facility]  [address] [place] [address], [address]  [facility] [address]  Dept: [phone]  Dept Fax: [phone]  Primary Provider: Elliott Snwo DO  Pre-op Performing Provider: ELLIOTT SNOW    PREOPERATIVE EVALUATION:  Today's date: 10/7/2020    Wang Balderrama is a 60 y.o. male who presents for a preoperative evaluation.    Surgical Information:  Surgery Details 10/7/2020   Surgery/Procedure: Lithotripsy   Surgery Location: [facility]   Surgeon: Dr Romo   Surgery Date: 10/15/2020   Where patient plans to recover: at home with family     Fax number for surgical facility: [facility]  Type of Anesthesia Anticipated: to be determined    Subjective     HPI related to upcoming procedure: He has a history of nephrolithiasis.  This summer he started noticing blood in his urine.  He was seen by urology and had a CT urogram which showed several nonobstructing stones in the left kidney.  The largest was 7 mm.  He is scheduled for a lithotripsy on 10/15/2020.    He has a medical history significant for prostate cancer, status post robotic prostatectomy on 3/19/2019 and radiation therapy, bladder tumor, status post bladder tumor resection on 2/28/2019, hypertension, type 2 diabetes, microalbuminuria, peripheral neuropathy, hyperlipidemia, hypothyroidism gout, obesity and obstructive sleep apnea.  He feels like his chronic medical problems are stable.  There is no sign of recurrence of the prostate cancer based on recent CT scan and laboratory results.     On 9/10/2020 he had labs checked as part of his annual wellness visit.  This showed a hemoglobin A1c of 6.8%, glucose 158, CMP, TSH and uric acid level normal.  His LDL was 166 and there was evidence of microalbuminuria.  He had a normal EKG on 2/12/2019.      Preop Questions 10/7/2020   Have you ever had a heart attack or stroke? No   Have you ever had surgery on your heart or blood vessels, such as a stent  placement, a coronary artery bypass, or surgery on an artery in your head, neck, heart, or legs? No   Do you have chest pain with activity? No   Do you have a history of  heart failure? No   Do you currently have a cold, bronchitis or symptoms of other infection? No   Do you have a cough, shortness of breath, or wheezing? No   Do you or anyone in your family have previous history of blood clots? No   Do you or does anyone in your family have a serious bleeding problem such as prolonged bleeding following surgeries or cuts? No   Have you ever had problems with anemia or been told to take iron pills? No   Have you had any abnormal blood loss such as black, tarry or bloody stools? No   Have you ever had a blood transfusion? No   Are you willing to have a blood transfusion if it is medically needed before, during, or after your surgery? Yes   Have you or any of your relatives ever had problems with anesthesia? No   Do you have sleep apnea, excessive snoring or daytime drowsiness? YES - snoring   Do you have a CPAP machine? No   Do you have any artifical heart valves or other implanted medical devices like a pacemaker, defibrillator, or continuous glucose monitor? No   Do you have artificial joints? No   Are you allergic to latex? No         Patient does not have a Health Care Directive or Living Will: Discussed advance care planning with patient; information given to patient to review.    RX [place] ([place] reviewed:  no concerns  [id number]}      Review of Systems  CONSTITUTIONAL: NEGATIVE for fever, chills, change in weight  INTEGUMENTARY/SKIN: NEGATIVE for worrisome rashes, moles or lesions  EYES: NEGATIVE for vision changes or irritation  ENT/MOUTH: NEGATIVE for ear, mouth and throat problems  RESP: NEGATIVE for significant cough or SOB  CV: NEGATIVE for chest pain, palpitations or peripheral edema  GI: NEGATIVE for nausea, abdominal pain, heartburn, or change in bowel habits  : NEGATIVE for frequency,  dysuria, or hematuria  MUSCULOSKELETAL: NEGATIVE for significant arthralgias or myalgia  NEURO: NEGATIVE for weakness, dizziness or paresthesias  ENDOCRINE: NEGATIVE for temperature intolerance, skin/hair changes  HEME: NEGATIVE for bleeding problems  PSYCHIATRIC: NEGATIVE for changes in mood or affect    Patient Active Problem List    Diagnosis Date Noted     Prostate cancer (H) 10/07/2020     Peripheral polyneuropathy 09/10/2020     Obesity (BMI 35.0-39.9) with comorbidity (H) 03/18/2019     LILLIANA (obstructive sleep apnea) 07/20/2017     Gout 04/28/2016     Hypothyroidism      Hyperlipidemia LDL goal <70      Hypertension      Controlled type 2 diabetes mellitus with microalbuminuria, without long-term current use of insulin (H)      No past medical history on file.  No past surgical history on file.  Current Outpatient Medications   Medication Sig Dispense Refill     allopurinoL (ZYLOPRIM) 100 MG tablet Take 1 tablet by mouth once daily 90 tablet 3     amLODIPine (NORVASC) 10 MG tablet Take 1 tablet (10 mg total) by mouth daily. 90 tablet 3     ascorbic acid (VITAMIN C ORAL) Take by mouth daily.       aspirin 81 MG EC tablet Take 81 mg by mouth daily.       atenoloL (TENORMIN) 100 MG tablet Take 1 tablet (100 mg total) by mouth daily. 90 tablet 3     docosahexaenoic acid/epa (FISH OIL ORAL) Take by mouth daily.       ECHINACEA ORAL Take by mouth daily.       levothyroxine (SYNTHROID, LEVOTHROID) 150 MCG tablet Take 1 tablet by mouth once daily 90 tablet 3     losartan (COZAAR) 100 MG tablet Take 1 tablet by mouth once daily 90 tablet 3     melatonin 10 mg Tab Take by mouth at bedtime.       metFORMIN (GLUCOPHAGE) 1000 MG tablet Take 1 tablet (1,000 mg total) by mouth 2 (two) times a day with meals. 180 tablet 3     sildenafil (REVATIO) 20 mg tablet as needed.        simvastatin (ZOCOR) 40 MG tablet Take 1 tablet (40 mg total) by mouth at bedtime. 90 tablet 3     No current facility-administered medications for this  "visit.        No Known Allergies    Social History     Tobacco Use     Smoking status: Never Smoker     Smokeless tobacco: Never Used   Substance Use Topics     Alcohol use: Yes     Comment: rare      Family History   Problem Relation Age of Onset     Hypertension Sister      Diabetes Sister      Social History     Substance and Sexual Activity   Drug Use No           Objective   /74   Pulse 62   Ht 6' 1.5\" (1.867 m)   Wt (!) 280 lb (127 kg)   SpO2 98%   BMI 36.44 kg/m    Physical Exam  General Appearance: Alert, cooperative, no distress, appears stated age  Head: Normocephalic, without obvious abnormality, atraumatic  Eyes: PERRL, conjunctiva/corneas clear, EOM's intact  Ears: Normal TM's and external ear canals, both ears  Nose: Nares normal, septum midline,mucosa normal, no drainage  Throat: Lips, mucosa, and tongue normal; teeth and gums normal  Neck: Supple, symmetrical, trachea midline, no adenopathy;  thyroid: not enlarged, symmetric, no tenderness/mass/nodules  Back: Symmetric, no curvature, ROM normal, no CVA tenderness  Lungs: Clear to auscultation bilaterally, respirations unlabored  Heart: Regular rate and rhythm, S1 and S2 normal, no murmur, rub, or gallop,  Abdomen: Soft, non-tender, bowel sounds active all four quadrants,  no masses, no organomegaly  Musculoskeletal: Normal range of motion. No joint swelling or deformity.   Extremities: Extremities normal, atraumatic, no cyanosis or edema  Skin: Skin color, texture, turgor normal, no rashes or lesions  Lymph nodes: Cervical, supraclavicular, and axillary nodes normal  Neurologic: He is alert.  Normal speech.  No focal deficits.  Normal deep tendon reflexes.   Psychiatric: He has a normal mood and affect.     Recent Labs   Lab Test 09/10/20  0929 02/12/19  1042   HGB  --  15.9   PLT  --  255    138   K 4.2 4.2   CREATININE 0.92 0.92        PRE-OP Diagnostics:   No labs were ordered during this visit.  No EKG required for low risk " surgery (cataract, skin procedure, breast biopsy, etc).         Assessment & Plan       The proposed surgical procedure is considered LOW risk.    REVISED CARDIAC RISK INDEX   The patient has the following serious cardiovascular risks for perioperative complications:  No serious cardiac risks = 0 points    INTERPRETATION: 0 points: Class I (very low risk - 0.4% complication rate)    1. Pre-operative examination  He is cleared to proceed with the lithotripsy procedure is scheduled on 10/15/2020.  His recent laboratory work-up on 9/10/2020 was stable including a creatinine of 0.92, GFR greater than 60, potassium 4.2.    2. Nephrolithiasis  Cleared to proceed with the lithotripsy as scheduled.    3. Controlled type 2 diabetes mellitus with microalbuminuria, without long-term current use of insulin (H)  Continue metformin 1000 mg twice daily.  His recent hemoglobin A1c was normal at 6.8%.    4. Prostate cancer (H)  He will continue to follow-up closely with urology.  His PSA has been undetectable and there is no sign of recurrence on the recent CT scan.    5. Peripheral polyneuropathy  He will continue to work on keeping the diabetes under good control.    6. Hypertension  Blood pressures well controlled on losartan and amlodipine.  He is going to hold the losartan on the morning of surgery.    7. Hyperlipidemia LDL goal <70  Well-controlled on simvastatin 40 mg daily.  His LDL was 66 9/10/2020.    8. Hypothyroidism, unspecified type  Stable on levothyroxine.  His TSH was normal on 9/10/2020.    9. LILLIANA (obstructive sleep apnea)  Stable on CPAP    10. Obesity (BMI 35.0-39.9) with comorbidity (H)  He will continue to work on lifestyle modifications to help with weight loss.    11. Gout, unspecified cause, unspecified chronicity, unspecified site  Stable on allopurinol.  His uric acid level was 6.2 on 9/10/2020.    I personally reviewed the urology consultation notes including the CT urogram results and recent lab results  over the past month.      The patient has the following additional risks and recommendations for perioperative complications:     - No identified additional risk factors other than previously addressed     MEDICATION INSTRUCTIONS:    CARDIAC Medications:   - ACE/ARB: HOLD due to exceptional risk of hypotension during surgery. {    RECOMMENDATION:  APPROVAL GIVEN to proceed with proposed procedure, without further diagnostic evaluation.    No follow-ups on file.    Signed Electronically by: Elliott Elam DO    Copy of this evaluation report is provided to requesting physician.    Preop [facility] Preop Guidelines    Revised Cardiac Risk Index

## 2021-06-12 NOTE — TELEPHONE ENCOUNTER
New Appointment Needed  What is the reason for the visit:    Pre-Op Appt Request  When is the surgery? :  10.15.20  Where is the surgery?:   [facility]   Who [facility] the [facility]? :   [facility]  What type of surgery is being done?: to shatter a kidney stone   Provider Preference: PCP only  How soon do you need to be seen?: before the procedure  Waitlist offered?: No  Okay to leave a detailed message:  Yes

## 2021-06-16 PROBLEM — G47.33 OSA (OBSTRUCTIVE SLEEP APNEA): Status: ACTIVE | Noted: 2017-07-20

## 2021-06-16 PROBLEM — C61 PROSTATE CANCER (H): Status: ACTIVE | Noted: 2020-10-07

## 2021-06-16 PROBLEM — G62.9 PERIPHERAL POLYNEUROPATHY: Status: ACTIVE | Noted: 2020-09-10

## 2021-06-16 PROBLEM — E66.01 MORBID OBESITY (H): Status: ACTIVE | Noted: 2019-03-18

## 2021-06-19 NOTE — PROGRESS NOTES
Assessment / Impression     1. Controlled type 2 diabetes mellitus without complication, without long-term current use of insulin (H)  His hemoglobin A1c came back at 7.1%.  I recommended she continue metformin 1000 mg twice daily and keep working on eating healthy foods and getting regular exercise.  - Glycosylated Hemoglobin A1c  - Microalbumin, Random Urine    2. Hypertension  Currently well controlled on his medications.  I recommended he stop the hydrochlorothiazide since he has been having fairly chronic issues with gout lately.  He is going to increase the amlodipine to 10 mg daily and continue the losartan and atenolol at the current doses.    3. Hypothyroidism  2 new levothyroxine.  We will make adjustments to this if needed.  - Thyroid Cascade    4. Other hyperlipidemia  Continue simvastatin 40 mg daily.  - Comprehensive Metabolic Panel  - Lipid Cascade    5. Gout  Continue allopurinol 100 mg daily.  I recommended he stop the hydrochlorothiazide as this may contribute to worsening gout.    6. LILLIANA (obstructive sleep apnea)  Continue using a pillow and Breathe Right strips which seem to help for now.    7. Obesity  Continue to work on getting regular exercising and eating a healthy diet.    8. Elevated PSA  Family encouraged him to consider getting back in to see urology about this.  He may need to have another biopsy.  We are going to check PSA level today and go from there.  - PSA (Prostatic-Specific Antigen), Annual Screen    9. Abnormal prostate biopsy          The following high BMI interventions were performed this visit: encouragement to exercise and lifestyle education regarding diet        Subjective:      HPI: Wang Balderrama is a 58 y.o. male who presents to the clinic for a follow-up visit.  He has a medical history significant for type 2 diabetes, hypertension, hyperlipidemia, hypothyroidism, obesity, gout, obstructive sleep apnea (not tolerating CPAP) and a history of elevated PSA levels  "with abnormal prostate biopsy results.  He has saw urology a few times in the fall of 2016 and had 2 separate biopsies of his prostate.  One of the biopsies checked on each of these occasions showed evidence of high-grade intraepithelial neoplasia.  He reports that they wanted him to recheck his PSA level in 6 months and consider having another biopsy at that time.  On 7/20/17 his PSA was 4.8.  He has not been back in to see urology since 10/31/16.    On 7/20/18 his hemoglobin A1c was 9.7%.  We increased the metformin to 1000 mg twice daily.  He reports that his blood sugars have come down nicely.  This past spring is 30 day blood glucose average was around 130.  Recently this has been higher during the summer months when he is out of his typical routine.  His recent 30 day average was 159.  He is trying to exercise and eat healthy meals.  He reports that his energy level is good.  He is sleeping with a pillow and using Breathe Right nasal strips since he does not tolerate CPAP.  He feels like this is working fairly well.  He reports not being able to follow-up with urology last year secondary to cost.  He has a high deductible plan is required to pay the first $3000.    Social history: .  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        Review of Systems  All other systems reviewed and are negative.     History   Smoking Status     Never Smoker   Smokeless Tobacco     Never Used       Family History   Problem Relation Age of Onset     Hypertension Sister      Diabetes Sister        Objective:     /80 (Patient Site: Left Arm, Patient Position: Sitting, Cuff Size: Adult Large)  Pulse 60  Temp 97.8  F (36.6  C) (Oral)   Resp 16  Ht 6' 1.5\" (1.867 m)  Wt (!) 287 lb 7 oz (130.4 kg)  BMI 37.41 kg/m2  Physical Examination: General appearance - alert, well appearing, and in no distress  Eyes: pupils equal and reactive, extraocular eye movements intact  Mouth: mucous membranes " moist, pharynx normal without lesions  Neck: supple, no significant adenopathy  Lungs: clear to auscultation, no wheezes, rales or rhonchi, symmetric air entry  Heart: normal rate, regular rhythm, normal S1, S2, no murmurs, rubs, clicks or gallops  Abdomen: soft, nontender, nondistended, no masses or organomegaly  Neurological: alert, oriented, normal speech, no focal findings or movement disorder noted.  Monofilament foot exam reveals slightly decreased sensation bilaterally.  Extremities: No edema, no clubbing or cyanosis  Psychiatric: Normal affect. Does not appear anxious or depressed.    Recent Results (from the past 168 hour(s))   Glycosylated Hemoglobin A1c   Result Value Ref Range    Hemoglobin A1c 7.1 (H) 3.5 - 6.0 %       Current Outpatient Prescriptions   Medication Sig     allopurinol (ZYLOPRIM) 100 MG tablet TAKE ONE TABLET BY MOUTH ONCE DAILY     aspirin 81 MG EC tablet Take 81 mg by mouth daily.     atenolol (TENORMIN) 100 MG tablet Take 1 tablet (100 mg total) by mouth daily.     indomethacin (INDOCIN) 50 MG capsule TAKE ONE CAPSULE BY MOUTH THREE TIMES DAILY AS NEEDED     levothyroxine (SYNTHROID, LEVOTHROID) 125 MCG tablet TAKE ONE TABLET BY MOUTH ONCE DAILY     losartan (COZAAR) 100 MG tablet Take 1 tablet by mouth daily     metFORMIN (GLUCOPHAGE) 1000 MG tablet Take 1 tablet (1,000 mg total) by mouth 2 (two) times a day with meals.     simvastatin (ZOCOR) 40 MG tablet TAKE ONE TABLET BY MOUTH ONCE DAILY AT BEDTIME     amLODIPine (NORVASC) 10 MG tablet Take 1 tablet (10 mg total) by mouth daily.

## 2021-06-20 NOTE — PROGRESS NOTES
"Assessment / Impression     1. Acute bronchitis     2. Wheezing     3. Sore throat  Rapid Strep A Screen- Throat Swab    Group A Strep, RNA Direct Detection, Throat   4. Type 2 diabetes mellitus (H)           Plan:     He was given a prescription for a Z-Jean Marie and a short course of prednisone.  He does not feel like he needs an inhaler at this point.  He may continue the decongestant and I encouraged him to stay well-hydrated and get plenty of rest.  His rapid strep test was negative.  This will be sent for culture.  I explained that his blood sugars will increase on the prednisone.  He will monitor that.    Subjective:      HPI: Wang Balderrama is a 58 y.o. male who presents to the clinic complaining of ongoing chest congestion, wheezing and coughing symptoms over the past 3 weeks.  On 9/10/18 he was seen at an outside urgent care and was diagnosed with sinusitis.  He was treated with a seven-day course of doxycycline.  He just finished the antibiotic, but he has not noticed significant improvement of his symptoms.  He feels like the congestion has moved into his chest.  His breathing feels tight and he has been wheezing at times.  He denies fevers.  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  He denies having fever.  He has a history of type 2 diabetes and is currently on metformin 1000 mg twice daily.  His hemoglobin A1c was 7.1% on 8/7/18.      Review of Systems  Pertinent items are noted in HPI.       Objective:     /74 (Patient Site: Right Arm, Patient Position: Sitting, Cuff Size: Adult Large)  Pulse 72  Temp 98.5  F (36.9  C) (Oral)   Resp 16  Ht 6' 1.5\" (1.867 m)  Wt (!) 288 lb 6 oz (130.8 kg)  SpO2 97% Comment: RA  BMI 37.53 kg/m2    General Appearance: Alert, cooperative, appears slightly fatigued.  Head: Normocephalic, without obvious abnormality, " atraumatic.  Eyes: PERRL, conjunctiva/corneas clear, EOM's intact.  Ears: Normal TM's and external ear canals, both ears.  Throat: Slightly erythematous. No exudates.  Neck: Supple, symmetrical, trachea midline, no lymphadenopathy.  Lungs: Trace wheezing throughout, worse when he coughs.  No rhonchi, rales or crackles. Respirations unlabored.  Heart:: Regular rate and rhythm.  Extremities: Extremities normal, atraumatic, no cyanosis or edema.        Recent Results (from the past 168 hour(s))   Rapid Strep A Screen- Throat Swab   Result Value Ref Range    Rapid Strep A Antigen No Group A Strep detected, presumptive negative No Group A Strep detected, presumptive negative

## 2021-06-24 NOTE — PATIENT INSTRUCTIONS - HE
Hold the metformin and losartan on the morning of surgery.  Hold all supplements, aspirin and NSAIDs for 7 days prior to surgery.    Follow your surgeon's direction on when to stop eating and drinking prior to surgery.  Your surgeon will be managing your pain after your surgery.

## 2021-06-24 NOTE — PROGRESS NOTES
Preoperative Exam    Scheduled Procedure: Robotic prostatectomy surgery  Surgery Date:  2/18/19  Surgery Location: [facility]    Surgeon:  Dr. Romo    Assessment/Plan:     1. Preop general physical exam  He is cleared to proceed with the laparoscopic robotic prostatectomy as scheduled.  He has already stopped aspirin.  I recommended he hold metformin and losartan on the morning of surgery.  We checked an EKG in the office today.  I provided an initial interpretation which showed sinus bradycardia with a heart rate of 56.  There are no ST-T wave changes concerning for ACS.  Recommended that we check a CMP, hemogram, TSH and hemoglobin A1c as part of today's labs.  - Electrocardiogram Perform and Read    2. Prostate cancer (H)  He is cleared to proceed with the laparoscopic robotic prostatectomy as scheduled.  - Comprehensive Metabolic Panel  - HM2(CBC w/o Differential)    3. Hypertension  Pressure is stable on amlodipine 10 mg daily, atenolol 100 mg daily and losartan 100 mg daily.  He is going to hold losartan on the morning of surgery.    4. Controlled type 2 diabetes mellitus without complication, without long-term current use of insulin (H)  Controlled on metformin 1000 mg twice daily.  He is going to hold this medication on the morning of surgery.  - Glycosylated Hemoglobin A1c    5. Hypothyroidism, unspecified type  To check a TSH level today since it was a little high when it was checked on 11/13/18 (6.66).  He was not taking the medication appropriately at that time.  We can make dosage changes to the levothyroxine if necessary taste on the lab results.  - Thyroid Cascade    6. Other hyperlipidemia  Continue simvastatin 40 mg daily.    7. LILLIANA (obstructive sleep apnea)  He is currently using Breathe Right strips for this.    I personally reviewed the urology notes from 1/25/19 including the MRI fusion biopsy results that showed evidence of adenocarcinoma on the left as well as lab results over the past  6 months.    Surgical Procedure Risk: Intermediate (reported cardiac risk generally 1-5%)  Have you had prior anesthesia?: Yes  Have you or any family members had a previous anesthesia reaction:  No  Do you or any family members have a history of a clotting or bleeding disorder?: No  Cardiac Risk Assessment: no increased risk for major cardiac complications    Patient approved for surgery with general or local anesthesia.        Functional Status: Independent  Patient plans to recover at home with family.     Subjective:      Wang Balderrama is a 59 y.o. male who presents for a preoperative consultation.  He has a history of an elevated PSA.  On 1/25/19 which showed evidence of adenocarcinoma on the left.  They discussed options with him and he decided to pursue laparoscopic robotic prostatectomy which is scheduled for 2/18/19.  He has a medical history significant for hypertension, hyperlipidemia, type 2 diabetes, obstructive sleep apnea, hypothyroidism and obesity.  8 7/18 his hemoglobin A1c was 7.1%.  He takes metformin 1000 mg twice daily.  His CMP at that time was normal with the exception that his glucose was high at 175.  His LDL was 80.  On 11/13/18 his PSA was elevated at 6.6.  The TSH was also elevated at 6.66 with a normal free T4 of 1.  He admits that he was not taking the levothyroxine appropriately prior to that lab test being done.  No dosage adjustments have been made, but he is currently taking the levothyroxine daily.    All other systems reviewed and are negative, other than those listed in the HPI.    Pertinent History  Do you have difficulty breathing or chest pain after walking up a flight of stairs: No  History of obstructive sleep apnea: Yes: He uses Breathe Right strips, not CPAP  Steroid use in the last 6 months:Yes  Frequent Aspirin/NSAID use: Yes: He has stopped taking aspirin since his biopsy on 1/25/19  Prior Blood Transfusion: No  Prior Blood Transfusion Reaction: No  If for some  reason prior to, during or after the procedure, if it is medically indicated, would you be willing to have a blood transfusion?:  There is no transfusion refusal.    Current Outpatient Medications   Medication Sig Dispense Refill     allopurinol (ZYLOPRIM) 100 MG tablet Take 1 tablet (100 mg total) by mouth daily. 90 tablet 3     amLODIPine (NORVASC) 10 MG tablet Take 1 tablet (10 mg total) by mouth daily. 90 tablet 3     atenolol (TENORMIN) 100 MG tablet Take 1 tablet (100 mg total) by mouth daily. 90 tablet 1     levothyroxine (SYNTHROID) 150 MCG tablet Take 1 tablet (150 mcg total) by mouth daily. 90 tablet 3     losartan (COZAAR) 100 MG tablet Take 1 tablet (100 mg total) by mouth daily. 90 tablet 2     metFORMIN (GLUCOPHAGE) 1000 MG tablet Take 1 tablet (1,000 mg total) by mouth 2 (two) times a day with meals. 180 tablet 3     simvastatin (ZOCOR) 40 MG tablet Take 1 tablet (40 mg total) by mouth at bedtime. 90 tablet 3     aspirin 81 MG EC tablet Take 81 mg by mouth daily.       indomethacin (INDOCIN) 50 MG capsule TAKE ONE CAPSULE BY MOUTH THREE TIMES DAILY AS NEEDED 30 capsule 2     No current facility-administered medications for this visit.         No Known Allergies    Patient Active Problem List   Diagnosis     Obesity     Hypothyroidism     Hyperlipidemia     Hypertension     Controlled type 2 diabetes mellitus without complication (H)     Gout     Elevated PSA     Abnormal prostate biopsy     LILLIANA (obstructive sleep apnea)       No past medical history on file.    No past surgical history on file.    Social History     Socioeconomic History     Marital status:      Spouse name: Not on file     Number of children: Not on file     Years of education: Not on file     Highest education level: Not on file   Social Needs     Financial resource strain: Not on file     Food insecurity - worry: Not on file     Food insecurity - inability: Not on file     Transportation needs - medical: Not on file      "Transportation needs - non-medical: Not on file   Occupational History     Not on file   Tobacco Use     Smoking status: Never Smoker     Smokeless tobacco: Never Used   Substance and Sexual Activity     Alcohol use: Yes     Comment: rare     Drug use: No     Sexual activity: Not on file   Other Topics Concern     Not on file   Social History Narrative     Not on file             Objective:     Vitals:    02/12/19 1001   BP: 130/74   Pulse: 64   Temp: 97.4  F (36.3  C)   TempSrc: Oral   Weight: (!) 256 lb 8 oz (116.3 kg)   Height: 6' 1.5\" (1.867 m)         Physical Exam:  General Appearance: Alert, cooperative, no distress, appears stated age  Head: Normocephalic, without obvious abnormality, atraumatic  Eyes: PERRL, conjunctiva/corneas clear, EOM's intact  Ears: Normal TM's and external ear canals, both ears  Nose: Nares normal, septum midline,mucosa normal, no drainage  Throat: Lips, mucosa, and tongue normal; teeth and gums normal  Neck: Supple, symmetrical, trachea midline, no adenopathy;  thyroid: not enlarged, symmetric, no tenderness/mass/nodules  Back: Symmetric, no curvature, ROM normal, no CVA tenderness  Lungs: Clear to auscultation bilaterally, respirations unlabored  Heart: Regular rate and rhythm, S1 and S2 normal, no murmur, rub, or gallop,  Abdomen: Soft, non-tender, bowel sounds active all four quadrants,  no masses, no organomegaly  Musculoskeletal: Normal range of motion. No joint swelling or deformity.   Extremities: Extremities normal, atraumatic, no cyanosis or edema  Skin: Skin color, texture, turgor normal, no rashes or lesions  Lymph nodes: Cervical, supraclavicular, and axillary nodes normal  Neurologic: He is alert.  Normal speech.  No focal deficits.  Normal deep tendon reflexes.   Psychiatric: He has a normal mood and affect.     Patient Instructions     Hold the metformin and losartan on the morning of surgery.  Hold all supplements, aspirin and NSAIDs for 7 days prior to " surgery.    Follow your surgeon's direction on when to stop eating and drinking prior to surgery.  Your surgeon will be managing your pain after your surgery.            Labs:  Recent Results (from the past 120 hour(s))   Electrocardiogram Perform and Read    Collection Time: 02/12/19 10:12 AM   Result Value Ref Range    SYSTOLIC BLOOD PRESSURE  mmHg    DIASTOLIC BLOOD PRESSURE  mmHg    VENTRICULAR RATE 56 BPM    ATRIAL RATE 56 BPM    P-R INTERVAL 196 ms    QRS DURATION 98 ms    Q-T INTERVAL 432 ms    QTC CALCULATION (BEZET) 416 ms    P Axis 31 degrees    R AXIS 12 degrees    T AXIS 15 degrees    MUSE DIAGNOSIS       Sinus bradycardia  Otherwise normal ECG  No previous ECGs available  Confirmed by SHE COULTER MD LOC: ([id number]) on 2/12/2019 1:21:06 PM     Comprehensive Metabolic Panel    Collection Time: 02/12/19 10:42 AM   Result Value Ref Range    Sodium 138 136 - 145 mmol/L    Potassium 4.2 3.5 - 5.0 mmol/L    Chloride 105 98 - 107 mmol/L    CO2 24 22 - 31 mmol/L    Anion Gap, Calculation 9 5 - 18 mmol/L    Glucose 134 (H) 70 - 125 mg/dL    BUN 16 8 - 22 mg/dL    Creatinine 0.92 0.70 - 1.30 mg/dL    GFR MDRD Af Amer >60 >60 mL/min/1.73m2    GFR MDRD Non Af Amer >60 >60 mL/min/1.73m2    Bilirubin, Total 0.9 0.0 - 1.0 mg/dL    Calcium 9.8 8.5 - 10.5 mg/dL    Protein, Total 6.9 6.0 - 8.0 g/dL    Albumin 4.1 3.5 - 5.0 g/dL    Alkaline Phosphatase 60 45 - 120 U/L    AST 23 0 - 40 U/L    ALT 40 0 - 45 U/L   HM2(CBC w/o Differential)    Collection Time: 02/12/19 10:42 AM   Result Value Ref Range    WBC 8.4 4.0 - 11.0 thou/uL    RBC 5.14 4.40 - 6.20 mill/uL    Hemoglobin 15.9 14.0 - 18.0 g/dL    Hematocrit 48.7 40.0 - 54.0 %    MCV 95 80 - 100 fL    MCH 31.0 27.0 - 34.0 pg    MCHC 32.7 32.0 - 36.0 g/dL    RDW 11.7 11.0 - 14.5 %    Platelets 255 140 - 440 thou/uL    MPV 8.5 7.0 - 10.0 fL   Glycosylated Hemoglobin A1c    Collection Time: 02/12/19 10:42 AM   Result Value Ref Range    Hemoglobin A1c 7.0 (H) 3.5 - 6.0 %    Thyroid Cascade    Collection Time: 02/12/19 10:42 AM   Result Value Ref Range    TSH 2.88 0.30 - 5.00 uIU/mL       Immunization History   Administered Date(s) Administered     Influenza, inj, historic,unspecified 11/02/2007     Influenza,seasonal quad, PF, 36+MOS 10/03/2013     Tdap 05/05/2011           Electronically signed by Elliott Elam DO 02/14/19 10:04 AM

## 2021-06-24 NOTE — TELEPHONE ENCOUNTER
Who is calling: Jessica -   Reason for Call:  Received Pre-op 2/12/19 , missing labs & EKG, Please fax to  [phone], please call Jessica with any questions.   Date of last appointment with primary care:  2/12/19  Has the patient been recently seen:  Yes  Okay to leave a detailed message: No

## 2021-06-25 NOTE — PROGRESS NOTES
Preoperative Exam    Scheduled Procedure: Robotic prostatectomy surgery  Surgery Date:  03/19/19  Surgery Location: [facility]   [id number]  Surgeon:  Dr. Romo    Assessment/Plan:     1. Preop general physical exam  He is cleared to proceed with the laparoscopic robotic prostatectomy as scheduled.  He has already stopped aspirin.  I recommended he hold metformin and losartan on the morning of surgery.  We checked an EKG in the office on 2/12/19 which showed sinus bradycardia with a heart rate of 56.  There were no ST-T wave changes concerning for ACS.    Lab results from 2/12/19 showed a normal hemogram (hemoglobin 15.9), CMP (potassium 4.2, creatinine 0.92), TSH and his hemoglobin A1c was 7%       2. Prostate cancer (H)  He is cleared to proceed with the laparoscopic robotic prostatectomy as scheduled.  - Comprehensive Metabolic Panel  - HM2(CBC w/o Differential)    3.  Bladder tumor  Status post bladder resection on 2/28/19.  He will be reviewing these pathology results and treatment recommendations with his urologist.    4. Hypertension  Blood pressure is stable on amlodipine 10 mg daily, atenolol 100 mg daily and losartan 100 mg daily.  He is going to hold losartan on the morning of surgery.    5. Controlled type 2 diabetes mellitus without complication, without long-term current use of insulin (H)  Controlled on metformin 1000 mg twice daily.  He is going to hold this medication on the morning of surgery.  - Glycosylated Hemoglobin A1c was 7% on 2/12/19    6. Hypothyroidism, unspecified type  To check a TSH level today since it was a little high when it was checked on 11/13/18 (6.66).  He was not taking the medication appropriately at that time.  We can make dosage changes to the levothyroxine if necessary taste on the lab results.  - TSH was normal at 2.88 on 2/12/19    7. Other hyperlipidemia  Continue simvastatin 40 mg daily.    8. LILLIANA (obstructive sleep apnea)  He is currently using Breathe  Right strips for this.    9. Obesity  He will keep working on eating healthy foods and getting regular exercise.    I personally reviewed the urology notes from 1/25/19 including the MRI fusion biopsy results that showed evidence of adenocarcinoma on the left as well as lab results over the past 6 months.  I also personally reviewed the neurology notes from 2/28/19 in which the bladder tumor was resected.    Surgical Procedure Risk: Intermediate (reported cardiac risk generally 1-5%)  Have you had prior anesthesia?: Yes  Have you or any family members had a previous anesthesia reaction:  No  Do you or any family members have a history of a clotting or bleeding disorder?: No  Cardiac Risk Assessment: no increased risk for major cardiac complications    Patient approved for surgery with general or local anesthesia.          Functional Status: Independent  Patient plans to recover at home with family.     Subjective:      Wang Balderrama is a 59 y.o. male who presents for a preoperative consultation.  He has a history of an elevated PSA.  On 1/25/19 which showed evidence of adenocarcinoma on the left.  They discussed options with him and he decided to pursue laparoscopic robotic prostatectomy which is scheduled for 2/18/19.  Upon further review of his MRI there was evidence of a small enhancing lesion in the bladder.  They decided to postpone the prostatectomy and he underwent cystoscopy with bladder tumor resection on 2/28/19.  He has recovered well from this procedure.  He has a medical history significant for hypertension, hyperlipidemia, type 2 diabetes, obstructive sleep apnea, hypothyroidism and obesity.  On 2/12/18 his hemoglobin A1c was 7%.  He takes metformin 1000 mg twice daily.  His CMP, hemogram and TSH at that time were normal.  On 11/13/18 his PSA was elevated at 6.6.  He is not complaining of fevers, illness, chest pain, shortness of breath, abdominal/flank or back pain.  He is not complaining of  voiding or stooling difficulty.  He feels like he has recovered well after the tumor resection on 2/28/19.    All other systems reviewed and are negative, other than those listed in the HPI.    Pertinent History  Do you have difficulty breathing or chest pain after walking up a flight of stairs: No  History of obstructive sleep apnea: Yes: He uses Breathe Right strips, not CPAP  Steroid use in the last 6 months:Yes  Frequent Aspirin/NSAID use: Yes: He has stopped taking aspirin since his biopsy on 1/25/19  Prior Blood Transfusion: No  Prior Blood Transfusion Reaction: No  If for some reason prior to, during or after the procedure, if it is medically indicated, would you be willing to have a blood transfusion?:  There is no transfusion refusal.    Current Outpatient Medications   Medication Sig Dispense Refill     allopurinol (ZYLOPRIM) 100 MG tablet Take 1 tablet (100 mg total) by mouth daily. 90 tablet 3     amLODIPine (NORVASC) 10 MG tablet Take 1 tablet (10 mg total) by mouth daily. 90 tablet 3     aspirin 81 MG EC tablet Take 81 mg by mouth daily.       atenolol (TENORMIN) 100 MG tablet Take 1 tablet (100 mg total) by mouth daily. 90 tablet 1     levothyroxine (SYNTHROID) 150 MCG tablet Take 1 tablet (150 mcg total) by mouth daily. 90 tablet 3     losartan (COZAAR) 100 MG tablet Take 1 tablet (100 mg total) by mouth daily. 90 tablet 2     metFORMIN (GLUCOPHAGE) 1000 MG tablet Take 1 tablet (1,000 mg total) by mouth 2 (two) times a day with meals. 180 tablet 3     simvastatin (ZOCOR) 40 MG tablet Take 1 tablet (40 mg total) by mouth at bedtime. 90 tablet 3     indomethacin (INDOCIN) 50 MG capsule TAKE ONE CAPSULE BY MOUTH THREE TIMES DAILY AS NEEDED 30 capsule 2     No current facility-administered medications for this visit.         No Known Allergies    Patient Active Problem List   Diagnosis     Obesity     Hypothyroidism     Hyperlipidemia     Hypertension     Controlled type 2 diabetes mellitus without  "complication (H)     Gout     Elevated PSA     Abnormal prostate biopsy     LILLIANA (obstructive sleep apnea)       No past medical history on file.    No past surgical history on file.    Social History     Socioeconomic History     Marital status:      Spouse name: Not on file     Number of children: Not on file     Years of education: Not on file     Highest education level: Not on file   Occupational History     Not on file   Social Needs     Financial resource strain: Not on file     Food insecurity:     Worry: Not on file     Inability: Not on file     Transportation needs:     Medical: Not on file     Non-medical: Not on file   Tobacco Use     Smoking status: Never Smoker     Smokeless tobacco: Never Used   Substance and Sexual Activity     Alcohol use: Yes     Comment: rare     Drug use: No     Sexual activity: Not on file   Lifestyle     Physical activity:     Days per week: Not on file     Minutes per session: Not on file     Stress: Not on file   Relationships     Social connections:     Talks on phone: Not on file     Gets together: Not on file     Attends [employer] service: Not on file     Active member of club or organization: Not on file     Attends meetings of clubs or organizations: Not on file     Relationship status: Not on file     Intimate partner violence:     Fear of current or ex partner: Not on file     Emotionally abused: Not on file     Physically abused: Not on file     Forced sexual activity: Not on file   Other Topics Concern     Not on file   Social History Narrative     Not on file             Objective:     Vitals:    03/18/19 1624   BP: 134/80   Pulse: (!) 56   Resp: 12   Temp: 97.4  F (36.3  C)   TempSrc: Oral   Weight: (!) 288 lb 9 oz (130.9 kg)   Height: 6' 1.5\" (1.867 m)         Physical Exam:  General Appearance: Alert, cooperative, no distress, appears stated age  Head: Normocephalic, without obvious abnormality, atraumatic  Eyes: PERRL, conjunctiva/corneas clear, EOM's " intact  Ears: Normal TM's and external ear canals, both ears  Nose: Nares normal, septum midline,mucosa normal, no drainage  Throat: Lips, mucosa, and tongue normal; teeth and gums normal  Neck: Supple, symmetrical, trachea midline, no adenopathy;  thyroid: not enlarged, symmetric, no tenderness/mass/nodules  Back: Symmetric, no curvature, ROM normal, no CVA tenderness  Lungs: Clear to auscultation bilaterally, respirations unlabored  Heart: Regular rate and rhythm, S1 and S2 normal, no murmur, rub, or gallop,  Abdomen: Soft, non-tender, bowel sounds active all four quadrants,  no masses, no organomegaly  Musculoskeletal: Normal range of motion. No joint swelling or deformity.   Extremities: Extremities normal, atraumatic, no cyanosis or edema  Skin: Skin color, texture, turgor normal, no rashes or lesions  Lymph nodes: Cervical, supraclavicular, and axillary nodes normal  Neurologic: He is alert.  Normal speech.  No focal deficits.  Normal deep tendon reflexes.   Psychiatric: He has a normal mood and affect.     There are no Patient Instructions on file for this visit.        Labs:  No results found for this or any previous visit (from the past 120 hour(s)).    Immunization History   Administered Date(s) Administered     Influenza, inj, historic,unspecified 11/02/2007     Influenza,seasonal quad, PF, 36+MOS 10/03/2013     Tdap 05/05/2011           Electronically signed by Elliott Elam DO 03/18/19 10:04 AM

## 2021-06-26 ENCOUNTER — HEALTH MAINTENANCE LETTER (OUTPATIENT)
Age: 61
End: 2021-06-26

## 2021-06-29 NOTE — PROGRESS NOTES
Progress Notes by Elliott Sandra DO at 9/10/2020  8:40 AM     Author: Elliott Sandra DO Service: -- Author Type: Physician    Filed: 9/10/2020 10:41 AM Encounter Date: 9/10/2020 Status: Signed    : Elliott Sandra DO (Physician)       MALE PREVENTATIVE EXAM    Assessment and Plan:       1. Encounter for general adult medical examination without abnormal findings  I encouraged him to get regular exercise and to eat a healthy diet.  We are going to check labs today.  He is up-to-date on colon cancer screening.    2. Obesity (BMI 35.0-39.9) with comorbidity (H)  We discussed lifestyle modifications to help with the weight loss.    3. Prostate cancer (H)  He will continue to follow-up closely with urology.  He recently had an undetectable PSA level and they checked a CT scan yesterday.  The results are still pending.  He is going to be following up with them early next week.    4. Controlled type 2 diabetes mellitus with microalbuminuria, without long-term current use of insulin (H)  I recommended that we check a hemoglobin A1c and urine microalbumin in addition to the CMP and cholesterol panels.  I recommended he continue metformin 1000 mg twice daily.  - metFORMIN (GLUCOPHAGE) 1000 MG tablet; Take 1 tablet (1,000 mg total) by mouth 2 (two) times a day with meals.  Dispense: 180 tablet; Refill: 3  - Glycosylated Hemoglobin A1c  - Microalbumin, Random Urine    5. Hypertension  Blood pressure is mildly elevated today.  He missed his evening doses yesterday.  I recommended that he work on lifestyle modifications and continue the atenolol, amlodipine and losartan.  He is going to return to the clinic in 3 months for follow-up.  - atenoloL (TENORMIN) 100 MG tablet; Take 1 tablet (100 mg total) by mouth daily.  Dispense: 90 tablet; Refill: 3  - amLODIPine (NORVASC) 10 MG tablet; Take 1 tablet (10 mg total) by mouth daily.  Dispense: 90 tablet; Refill: 3    6. Hyperlipidemia  LDL goal <70  I recommended he continue simvastatin 40 mg daily.  - simvastatin (ZOCOR) 40 MG tablet; Take 1 tablet (40 mg total) by mouth at bedtime.  Dispense: 90 tablet; Refill: 3  - Comprehensive Metabolic Panel  - Lipid Cascade    7. Hypothyroidism, unspecified type  I recommended he continue levothyroxine.  We are checking a thyroid cascade.  We can make dosage adjustments if necessary based on the lab results.  - Thyroid [place]    8. LILLIANA (obstructive sleep apnea)  Stable on CPAP    9. Gout, unspecified cause, unspecified chronicity, unspecified site  Stable on allopurinol 100 mg daily.  - Uric Acid    10. Peripheral polyneuropathy  I recommended he keep working on keeping the diabetes under good control.  We will continue to monitor this.    Next follow up:  No follow-ups on file.    Immunization Review  Adult Imm Review: Declines immunizations today      I discussed the following with the patient:   Adult Healthy Living: Importance of regular exercise  Healthy nutrition        Subjective:   Chief Complaint: Wang Balderrama is an 60 y.o. male here for a preventative health visit.     HPI:   He has a medical history significant for prostate cancer, status post robotic prostatectomy on 3/19/2019 and radiation therapy, bladder tumor, status post bladder tumor resection on 2/28/2019, hypertension, type 2 diabetes, microalbuminuria, peripheral neuropathy, hyperlipidemia, hypothyroidism gout, obesity and obstructive sleep apnea.  He feels like his chronic medical problems are stable.  He was recently evaluated by urology again because of noticing blood in his urine summer.  He reports that his PSA level is undetectable and he just had a CT scan yesterday.  He will be following up with them early next week to discuss the results.  On 8/26/2019 his hemoglobin A1c was 7.5%, positive urine microalbumin levels were present.  On 10/8/2019 he had a normal hemogram.  On 9/3/2019 he had a normal creatinine level.  On  2/12/2019 he had a normal TSH.   Any recent gout flares.  He has been staying physically active at work, but admits that there is room for improvement regarding diet.    Social history:     Healthy Habits  Are you taking a daily aspirin? Yes  Do you typically exercising at least 40 min, 3-4 times per week?  On feet a lot at work.  Do you usually eat at least 4 servings of fruit and vegetables a day, include whole grains and fiber and avoid regularly eating high fat foods? Tries to.  Have you had an eye exam in the past two years? Yes  Do you see a dentist twice per year? NO  Do you have any concerns regarding sleep? YES- trouble staying asleep.    Safety Screen  If you own firearms, are they secured in a locked gun cabinet or with trigger locks? The patient does not own any firearms  Do you feel you are safe where you are living?: Yes (9/10/2020  8:46 AM)  Do you feel you are safe in your relationship(s)?: Yes (9/10/2020  8:46 AM)      Review of Systems:  Please see above.  The rest of the review of systems are negative for all systems.     Cancer Screening     Patient has no health maintenance due at this time              History     Reviewed By Date/Time Sections Reviewed    Helga Howard CMA 9/10/2020  8:46 AM Tobacco            Objective:   Vital Signs: There were no vitals taken for this visit.       PHYSICAL EXAM  General Appearance: Alert, cooperative, no distress, appears stated age  Head: Normocephalic, without obvious abnormality, atraumatic  Eyes: PERRL, conjunctiva/corneas clear, EOM's intact  Ears: Normal TM's and external ear canals, both ears  Nose: Nares normal, septum midline,mucosa normal, no drainage  Throat: Lips, mucosa, and tongue normal; teeth and gums normal  Neck: Supple, symmetrical, trachea midline, no adenopathy;  thyroid: not enlarged, symmetric, no tenderness/mass/nodules  Back: Symmetric, no curvature, ROM normal, no CVA tenderness  Lungs: Clear to auscultation bilaterally,  respirations unlabored  Heart: Regular rate and rhythm, S1 and S2 normal, no murmur, rub, or gallop,  Abdomen: Soft, non-tender, bowel sounds active all four quadrants,  no masses, no organomegaly  Musculoskeletal: Normal range of motion. No joint swelling or deformity.   Extremities: Extremities normal, atraumatic, no cyanosis or edema  Skin: Skin color, texture, turgor normal, no rashes or lesions  Lymph nodes: Cervical, supraclavicular, and axillary nodes normal  Neurologic: He is alert.  Normal speech.  No focal deficits.  Normal deep tendon reflexes. Monofilament foot exam reveals sensory deficits in the first 3 toes bilaterally.  Psychiatric: He has a normal mood and affect.                Medication List          Accurate as of September 10, 2020 10:37 AM. If you have any questions, ask your nurse or doctor.            CONTINUE taking these medications    allopurinoL 100 MG tablet  Also known as:  ZYLOPRIM  INSTRUCTIONS:  Take 1 tablet by mouth once daily        amLODIPine 10 MG tablet  Also known as:  NORVASC  INSTRUCTIONS:  Take 1 tablet (10 mg total) by mouth daily.        aspirin 81 MG EC tablet  INSTRUCTIONS:  Take 81 mg by mouth daily.        atenoloL 100 MG tablet  Also known as:  TENORMIN  INSTRUCTIONS:  Take 1 tablet (100 mg total) by mouth daily.        ECHINACEA ORAL  INSTRUCTIONS:  Take by mouth daily.        FISH OIL ORAL  INSTRUCTIONS:  Take by mouth daily.        levothyroxine 150 MCG tablet  Also known as:  SYNTHROID, LEVOTHROID  INSTRUCTIONS:  Take 1 tablet by mouth once daily        losartan 100 MG tablet  Also known as:  COZAAR  INSTRUCTIONS:  Take 1 tablet by mouth once daily        melatonin 10 mg Tab  INSTRUCTIONS:  Take by mouth at bedtime.        metFORMIN 1000 MG tablet  Also known as:  GLUCOPHAGE  INSTRUCTIONS:  Take 1 tablet (1,000 mg total) by mouth 2 (two) times a day with meals.        sildenafil 20 mg tablet  Also known as:  REVATIO  INSTRUCTIONS:  as needed.        simvastatin  40 MG tablet  Also known as:  ZOCOR  INSTRUCTIONS:  Take 1 tablet (40 mg total) by mouth at bedtime.        VITAMIN C ORAL  INSTRUCTIONS:  Take by mouth daily.              Where to Get Your Medications      These medications were sent to [facility] [place] - [id number] [place] [address]    Phone:  [phone]     amLODIPine 10 MG tablet    atenoloL 100 MG tablet    metFORMIN 1000 MG tablet    simvastatin 40 MG tablet         Additional Screenings Completed Today:

## 2021-07-03 NOTE — ADDENDUM NOTE
Addendum Note by Elliott Snow DO at 3/18/2019  4:20 PM     Author: Elliott Snow DO Service: -- Author Type: Physician    Filed: 3/18/2019 10:12 PM Encounter Date: 3/18/2019 Status: Signed    : Elliott Snow DO (Physician)    Addended by: ELLIOTT SNOW on: 3/18/2019 10:12 PM        Modules accepted: Robert

## 2021-10-16 ENCOUNTER — HEALTH MAINTENANCE LETTER (OUTPATIENT)
Age: 61
End: 2021-10-16

## 2022-02-05 ENCOUNTER — HEALTH MAINTENANCE LETTER (OUTPATIENT)
Age: 62
End: 2022-02-05

## 2022-04-20 ENCOUNTER — MYC MEDICAL ADVICE (OUTPATIENT)
Dept: FAMILY MEDICINE | Facility: CLINIC | Age: 62
End: 2022-04-20

## 2022-09-25 ENCOUNTER — HEALTH MAINTENANCE LETTER (OUTPATIENT)
Age: 62
End: 2022-09-25

## 2023-02-04 ENCOUNTER — HEALTH MAINTENANCE LETTER (OUTPATIENT)
Age: 63
End: 2023-02-04

## 2023-05-13 ENCOUNTER — HEALTH MAINTENANCE LETTER (OUTPATIENT)
Age: 63
End: 2023-05-13

## 2023-12-23 ENCOUNTER — HEALTH MAINTENANCE LETTER (OUTPATIENT)
Age: 63
End: 2023-12-23

## 2024-03-02 ENCOUNTER — HEALTH MAINTENANCE LETTER (OUTPATIENT)
Age: 64
End: 2024-03-02